# Patient Record
Sex: FEMALE | Race: WHITE | NOT HISPANIC OR LATINO | Employment: UNEMPLOYED | URBAN - NONMETROPOLITAN AREA
[De-identification: names, ages, dates, MRNs, and addresses within clinical notes are randomized per-mention and may not be internally consistent; named-entity substitution may affect disease eponyms.]

---

## 2021-04-22 ENCOUNTER — INITIAL PRENATAL (OUTPATIENT)
Dept: OBSTETRICS AND GYNECOLOGY | Facility: CLINIC | Age: 26
End: 2021-04-22

## 2021-04-22 VITALS
SYSTOLIC BLOOD PRESSURE: 102 MMHG | BODY MASS INDEX: 29.41 KG/M2 | WEIGHT: 183 LBS | HEIGHT: 66 IN | DIASTOLIC BLOOD PRESSURE: 72 MMHG

## 2021-04-22 DIAGNOSIS — Z34.01 ENCOUNTER FOR SUPERVISION OF NORMAL FIRST PREGNANCY IN FIRST TRIMESTER: Primary | ICD-10-CM

## 2021-04-22 PROCEDURE — 0501F PRENATAL FLOW SHEET: CPT | Performed by: OBSTETRICS & GYNECOLOGY

## 2021-04-22 RX ORDER — PRENATAL VIT NO.126/IRON/FOLIC 28MG-0.8MG
TABLET ORAL DAILY
COMMUNITY

## 2021-04-22 NOTE — PROGRESS NOTES
New OB, US ordered today, reviewed and shows 8 weeks gestation, EDC 11/29  Having mild nausea, some fatigue, but rare headaches  New OB labs ordered today  Discussed OTC Unisom and B6, Miralax for consitpation  Discussed normal prenatal routines  Questions answered    Diagnoses and all orders for this visit:    1. Encounter for supervision of normal first pregnancy in first trimester (Primary)  -     Chlamydia trachomatis, Neisseria gonorrhoeae, PCR w/ confirmation - Urine, Urine, Clean Catch  -     ABO / Rh  -     Antibody Screen  -     CBC & Differential  -     Ambulatory Referral to Perinatology  -     Urine Culture - Urine, Urine, Clean Catch  -     ToxASSURE Select 13 (MW) - Urine, Clean Catch  -     Rubella Antibody, IgG  -     RPR  -     HIV-1 / O / 2 Ag / Antibody 4th Generation  -     Hepatitis B Surface Antigen

## 2021-04-28 LAB
ABO GROUP BLD: NORMAL
BACTERIA UR CULT: NORMAL
BACTERIA UR CULT: NORMAL
BASOPHILS # BLD AUTO: 0.04 10*3/MM3 (ref 0–0.2)
BASOPHILS NFR BLD AUTO: 0.5 % (ref 0–1.5)
BLD GP AB SCN SERPL QL: NEGATIVE
C TRACH RRNA SPEC QL NAA+PROBE: NEGATIVE
DRUGS UR: NORMAL
EOSINOPHIL # BLD AUTO: 0.15 10*3/MM3 (ref 0–0.4)
EOSINOPHIL NFR BLD AUTO: 1.7 % (ref 0.3–6.2)
ERYTHROCYTE [DISTWIDTH] IN BLOOD BY AUTOMATED COUNT: 12.4 % (ref 12.3–15.4)
HBV SURFACE AG SERPL QL IA: NEGATIVE
HCT VFR BLD AUTO: 39.9 % (ref 34–46.6)
HGB BLD-MCNC: 13.2 G/DL (ref 12–15.9)
HIV 1+2 AB+HIV1 P24 AG SERPL QL IA: NON REACTIVE
IMM GRANULOCYTES # BLD AUTO: 0.02 10*3/MM3 (ref 0–0.05)
IMM GRANULOCYTES NFR BLD AUTO: 0.2 % (ref 0–0.5)
LYMPHOCYTES # BLD AUTO: 2.99 10*3/MM3 (ref 0.7–3.1)
LYMPHOCYTES NFR BLD AUTO: 34.4 % (ref 19.6–45.3)
MCH RBC QN AUTO: 31.4 PG (ref 26.6–33)
MCHC RBC AUTO-ENTMCNC: 33.1 G/DL (ref 31.5–35.7)
MCV RBC AUTO: 95 FL (ref 79–97)
MONOCYTES # BLD AUTO: 0.5 10*3/MM3 (ref 0.1–0.9)
MONOCYTES NFR BLD AUTO: 5.8 % (ref 5–12)
N GONORRHOEA RRNA SPEC QL NAA+PROBE: NEGATIVE
NEUTROPHILS # BLD AUTO: 4.98 10*3/MM3 (ref 1.7–7)
NEUTROPHILS NFR BLD AUTO: 57.4 % (ref 42.7–76)
NRBC BLD AUTO-RTO: 0 /100 WBC (ref 0–0.2)
PLATELET # BLD AUTO: 250 10*3/MM3 (ref 140–450)
RBC # BLD AUTO: 4.2 10*6/MM3 (ref 3.77–5.28)
RH BLD: POSITIVE
RPR SER QL: NON REACTIVE
RUBV IGG SERPL IA-ACNC: 18.3 INDEX
WBC # BLD AUTO: 8.68 10*3/MM3 (ref 3.4–10.8)

## 2021-05-21 ENCOUNTER — ROUTINE PRENATAL (OUTPATIENT)
Dept: OBSTETRICS AND GYNECOLOGY | Facility: CLINIC | Age: 26
End: 2021-05-21

## 2021-05-21 VITALS — SYSTOLIC BLOOD PRESSURE: 102 MMHG | BODY MASS INDEX: 30.32 KG/M2 | DIASTOLIC BLOOD PRESSURE: 62 MMHG | WEIGHT: 185 LBS

## 2021-05-21 DIAGNOSIS — Z34.02 ENCOUNTER FOR SUPERVISION OF NORMAL FIRST PREGNANCY IN SECOND TRIMESTER: Primary | ICD-10-CM

## 2021-05-21 PROBLEM — Z34.01 ENCOUNTER FOR SUPERVISION OF NORMAL FIRST PREGNANCY IN FIRST TRIMESTER: Status: RESOLVED | Noted: 2021-04-22 | Resolved: 2021-05-21

## 2021-05-21 LAB
GLUCOSE UR STRIP-MCNC: NEGATIVE MG/DL
PROT UR STRIP-MCNC: NEGATIVE MG/DL

## 2021-05-21 PROCEDURE — 0502F SUBSEQUENT PRENATAL CARE: CPT | Performed by: OBSTETRICS & GYNECOLOGY

## 2021-05-21 NOTE — PROGRESS NOTES
Feeling well, no nausea  Has had a couple of lightheaded episodes  Reviewed normal prenatal labs  Discussed ffDNA and maternal carrier screening, declines for now    Diagnoses and all orders for this visit:    1. Encounter for supervision of normal first pregnancy in second trimester (Primary)

## 2021-05-27 ENCOUNTER — ROUTINE PRENATAL (OUTPATIENT)
Dept: OBSTETRICS AND GYNECOLOGY | Facility: CLINIC | Age: 26
End: 2021-05-27

## 2021-05-27 VITALS — WEIGHT: 188 LBS | SYSTOLIC BLOOD PRESSURE: 104 MMHG | BODY MASS INDEX: 30.81 KG/M2 | DIASTOLIC BLOOD PRESSURE: 64 MMHG

## 2021-05-27 DIAGNOSIS — Z34.02 ENCOUNTER FOR SUPERVISION OF NORMAL FIRST PREGNANCY IN SECOND TRIMESTER: Primary | ICD-10-CM

## 2021-05-27 LAB
GLUCOSE UR STRIP-MCNC: NEGATIVE MG/DL
PROT UR STRIP-MCNC: NEGATIVE MG/DL

## 2021-05-27 PROCEDURE — 99212 OFFICE O/P EST SF 10 MIN: CPT | Performed by: OBSTETRICS & GYNECOLOGY

## 2021-05-27 NOTE — PROGRESS NOTES
Woke up this morning with blood in her underwear. No bleeding since then.  O positive, Hgb 13.2  US today viable IUP   Bleeding precautions  Dx: threatened

## 2021-06-24 ENCOUNTER — ROUTINE PRENATAL (OUTPATIENT)
Dept: OBSTETRICS AND GYNECOLOGY | Facility: CLINIC | Age: 26
End: 2021-06-24

## 2021-06-24 VITALS — DIASTOLIC BLOOD PRESSURE: 82 MMHG | SYSTOLIC BLOOD PRESSURE: 124 MMHG | WEIGHT: 193 LBS | BODY MASS INDEX: 31.63 KG/M2

## 2021-06-24 DIAGNOSIS — Z34.02 ENCOUNTER FOR SUPERVISION OF NORMAL FIRST PREGNANCY IN SECOND TRIMESTER: Primary | ICD-10-CM

## 2021-06-24 LAB
GLUCOSE UR STRIP-MCNC: NEGATIVE MG/DL
PROT UR STRIP-MCNC: NEGATIVE MG/DL

## 2021-06-24 PROCEDURE — 0502F SUBSEQUENT PRENATAL CARE: CPT | Performed by: OBSTETRICS & GYNECOLOGY

## 2021-06-24 NOTE — PROGRESS NOTES
Starting to feel fetal movement  Feeling well  Gender peek today, BOY!  Schedule anatomy US 7/15    Diagnoses and all orders for this visit:    1. Encounter for supervision of normal first pregnancy in second trimester (Primary)

## 2021-07-15 ENCOUNTER — ROUTINE PRENATAL (OUTPATIENT)
Dept: OBSTETRICS AND GYNECOLOGY | Facility: CLINIC | Age: 26
End: 2021-07-15

## 2021-07-15 VITALS — BODY MASS INDEX: 32.45 KG/M2 | SYSTOLIC BLOOD PRESSURE: 102 MMHG | DIASTOLIC BLOOD PRESSURE: 80 MMHG | WEIGHT: 198 LBS

## 2021-07-15 DIAGNOSIS — Z34.02 ENCOUNTER FOR SUPERVISION OF NORMAL FIRST PREGNANCY IN SECOND TRIMESTER: Primary | ICD-10-CM

## 2021-07-15 LAB
GLUCOSE UR STRIP-MCNC: NEGATIVE MG/DL
PROT UR STRIP-MCNC: NEGATIVE MG/DL

## 2021-07-15 PROCEDURE — 0502F SUBSEQUENT PRENATAL CARE: CPT | Performed by: OBSTETRICS & GYNECOLOGY

## 2021-07-15 NOTE — PROGRESS NOTES
Feeling fetal movement  Feeling well  Has anatomy US later today    Diagnoses and all orders for this visit:    1. Encounter for supervision of normal first pregnancy in second trimester (Primary)

## 2021-08-12 ENCOUNTER — ROUTINE PRENATAL (OUTPATIENT)
Dept: OBSTETRICS AND GYNECOLOGY | Facility: CLINIC | Age: 26
End: 2021-08-12

## 2021-08-12 VITALS — SYSTOLIC BLOOD PRESSURE: 108 MMHG | WEIGHT: 205 LBS | DIASTOLIC BLOOD PRESSURE: 74 MMHG | BODY MASS INDEX: 33.59 KG/M2

## 2021-08-12 DIAGNOSIS — Z34.02 ENCOUNTER FOR SUPERVISION OF NORMAL FIRST PREGNANCY IN SECOND TRIMESTER: Primary | ICD-10-CM

## 2021-08-12 LAB
GLUCOSE UR STRIP-MCNC: NEGATIVE MG/DL
PROT UR STRIP-MCNC: NEGATIVE MG/DL

## 2021-08-12 PROCEDURE — 0502F SUBSEQUENT PRENATAL CARE: CPT | Performed by: OBSTETRICS & GYNECOLOGY

## 2021-08-12 NOTE — PROGRESS NOTES
Good fetal movement  Reviewed normal anatomy US  Glucola and Hgb ordered for next visit  Discussed possible dizzy spells and ensuring adequate hydration    Diagnoses and all orders for this visit:    1. Encounter for supervision of normal first pregnancy in second trimester (Primary)

## 2021-09-02 ENCOUNTER — ROUTINE PRENATAL (OUTPATIENT)
Dept: OBSTETRICS AND GYNECOLOGY | Facility: CLINIC | Age: 26
End: 2021-09-02

## 2021-09-02 VITALS — DIASTOLIC BLOOD PRESSURE: 76 MMHG | SYSTOLIC BLOOD PRESSURE: 118 MMHG | WEIGHT: 208 LBS | BODY MASS INDEX: 34.09 KG/M2

## 2021-09-02 DIAGNOSIS — Z34.93 PRENATAL CARE IN THIRD TRIMESTER: Primary | ICD-10-CM

## 2021-09-02 DIAGNOSIS — Z34.03 ENCOUNTER FOR SUPERVISION OF NORMAL FIRST PREGNANCY IN THIRD TRIMESTER: ICD-10-CM

## 2021-09-02 PROBLEM — Z34.02 ENCOUNTER FOR SUPERVISION OF NORMAL FIRST PREGNANCY IN SECOND TRIMESTER: Status: RESOLVED | Noted: 2021-05-21 | Resolved: 2021-09-02

## 2021-09-02 LAB
GLUCOSE UR STRIP-MCNC: NEGATIVE MG/DL
PROT UR STRIP-MCNC: NEGATIVE MG/DL

## 2021-09-02 PROCEDURE — 0502F SUBSEQUENT PRENATAL CARE: CPT | Performed by: OBSTETRICS & GYNECOLOGY

## 2021-09-02 NOTE — PATIENT INSTRUCTIONS
Jovanny Sterling Contractions  Contractions of the uterus can occur throughout pregnancy, but they are not always a sign that you are in labor. You may have practice contractions called [place] contractions. These false labor contractions are sometimes confused with true labor.  What are [place] contractions?  [place]ov[place] Sterling contractions are tightening movements that occur in the muscles of the uterus before labor. Unlike true labor contractions, these contractions do not result in opening (dilation) and thinning of the cervix. Toward the end of pregnancy (32-34 weeks), [place] contractions can happen more often and may become stronger. These contractions are sometimes difficult to tell apart from true labor because they can be very uncomfortable. You should not feel embarrassed if you go to the hospital with false labor.  Sometimes, the only way to tell if you are in true labor is for your health care provider to look for changes in the cervix. The health care provider will do a physical exam and may monitor your contractions. If you are not in true labor, the exam should show that your cervix is not dilating and your water has not broken.  If there are no other health problems associated with your pregnancy, it is completely safe for you to be sent home with false labor. You may continue to have [place] Sterling contractions until you go into true labor.  How to tell the difference between true labor and false labor  True labor  · Contractions last 30-70 seconds.  · Contractions become very regular.  · Discomfort is usually felt in the top of the uterus, and it spreads to the lower abdomen and low back.  · Contractions do not go away with walking.  · Contractions usually become more intense and increase in frequency.  · The cervix dilates and gets thinner.  False labor  · Contractions are usually shorter and not as strong as true labor contractions.  · Contractions are usually irregular.  · Contractions  are often felt in the front of the lower abdomen and in the groin.  · Contractions may go away when you walk around or change positions while lying down.  · Contractions get weaker and are shorter-lasting as time goes on.  · The cervix usually does not dilate or become thin.  Follow these instructions at home:    · Take over-the-counter and prescription medicines only as told by your health care provider.  · Keep up with your usual exercises and follow other instructions from your health care provider.  · Eat and drink lightly if you think you are going into labor.  · If [place] contractions are making you uncomfortable:  ? Change your position from lying down or resting to walking, or change from walking to resting.  ? Sit and rest in a tub of warm water.  ? Drink enough fluid to keep your urine pale yellow. Dehydration may cause these contractions.  ? Do slow and deep breathing several times an hour.  · Keep all follow-up prenatal visits as told by your health care provider. This is important.  Contact a health care provider if:  · You have a fever.  · You have continuous pain in your abdomen.  Get help right away if:  · Your contractions become stronger, more regular, and closer together.  · You have fluid leaking or gushing from your vagina.  · You pass blood-tinged mucus (bloody show).  · You have bleeding from your vagina.  · You have low back pain that you never had before.  · You feel your baby’s head pushing down and causing pelvic pressure.  · Your baby is not moving inside you as much as it used to.  Summary  · Contractions that occur before labor are called Jovanny Sterling contractions, false labor, or practice contractions.  · [place] contractions are usually shorter, weaker, farther apart, and less regular than true labor contractions. True labor contractions usually become progressively stronger and regular, and they become more frequent.  · Manage discomfort from [place] Sterling contractions  by changing position, resting in a warm bath, drinking plenty of water, or practicing deep breathing.  This information is not intended to replace advice given to you by your health care provider. Make sure you discuss any questions you have with your health care provider.  Document Revised: 11/30/2018 Document Reviewed: 05/03/2018  Elsevier Patient Education © 2021 [employer] Inc.

## 2021-09-02 NOTE — PROGRESS NOTES
Good fetal movement  Glucola and Hgb today, Rh positive  Discussed [place] Sterling contractions    Diagnoses and all orders for this visit:    1. Prenatal care in third trimester (Primary)  -     Gestational Screen 1 Hr ([facility])  -     Hemoglobin    2. Encounter for supervision of normal first pregnancy in third trimester

## 2021-09-03 LAB
GLUCOSE 1H P 50 G GLC PO SERPL-MCNC: 156 MG/DL (ref 65–139)
HGB BLD-MCNC: 11.2 G/DL (ref 12–15.9)

## 2021-09-14 ENCOUNTER — ROUTINE PRENATAL (OUTPATIENT)
Dept: OBSTETRICS AND GYNECOLOGY | Facility: CLINIC | Age: 26
End: 2021-09-14

## 2021-09-14 VITALS — DIASTOLIC BLOOD PRESSURE: 68 MMHG | SYSTOLIC BLOOD PRESSURE: 102 MMHG | WEIGHT: 209 LBS | BODY MASS INDEX: 34.25 KG/M2

## 2021-09-14 DIAGNOSIS — Z34.03 ENCOUNTER FOR SUPERVISION OF NORMAL FIRST PREGNANCY IN THIRD TRIMESTER: Primary | ICD-10-CM

## 2021-09-14 LAB
GLUCOSE UR STRIP-MCNC: NEGATIVE MG/DL
PROT UR STRIP-MCNC: NEGATIVE MG/DL

## 2021-09-14 PROCEDURE — 0502F SUBSEQUENT PRENATAL CARE: CPT | Performed by: OBSTETRICS & GYNECOLOGY

## 2021-09-14 NOTE — PATIENT INSTRUCTIONS
Jovanny Sterling Contractions  Contractions of the uterus can occur throughout pregnancy, but they are not always a sign that you are in labor. You may have practice contractions called [place] Sterling contractions. These false labor contractions are sometimes confused with true labor.  What are [place] Sterling contractions?  Jovanny Sterling contractions are tightening movements that occur in the muscles of the uterus before labor. Unlike true labor contractions, these contractions do not result in opening (dilation) and thinning of the cervix. Toward the end of pregnancy (32-34 weeks), [place] contractions can happen more often and may become stronger. These contractions are sometimes difficult to tell apart from true labor because they can be very uncomfortable. You should not feel embarrassed if you go to the hospital with false labor.  Sometimes, the only way to tell if you are in true labor is for your health care provider to look for changes in the cervix. The health care provider will do a physical exam and may monitor your contractions. If you are not in true labor, the exam should show that your cervix is not dilating and your water has not broken.  If there are no other health problems associated with your pregnancy, it is completely safe for you to be sent home with false labor. You may continue to have [place] Sterling contractions until you go into true labor.  How to tell the difference between true labor and false labor  True labor  · Contractions last 30-70 seconds.  · Contractions become very regular.  · Discomfort is usually felt in the top of the uterus, and it spreads to the lower abdomen and low back.  · Contractions do not go away with walking.  · Contractions usually become more intense and increase in frequency.  · The cervix dilates and gets thinner.  False labor  · Contractions are usually shorter and not as strong as true labor contractions.  · Contractions are usually irregular.  · Contractions  are often felt in the front of the lower abdomen and in the groin.  · Contractions may go away when you walk around or change positions while lying down.  · Contractions get weaker and are shorter-lasting as time goes on.  · The cervix usually does not dilate or become thin.  Follow these instructions at home:    · Take over-the-counter and prescription medicines only as told by your health care provider.  · Keep up with your usual exercises and follow other instructions from your health care provider.  · Eat and drink lightly if you think you are going into labor.  · If [place] Sterling contractions are making you uncomfortable:  ? Change your position from lying down or resting to walking, or change from walking to resting.  ? Sit and rest in a tub of warm water.  ? Drink enough fluid to keep your urine pale yellow. Dehydration may cause these contractions.  ? Do slow and deep breathing several times an hour.  · Keep all follow-up prenatal visits as told by your health care provider. This is important.  Contact a health care provider if:  · You have a fever.  · You have continuous pain in your abdomen.  Get help right away if:  · Your contractions become stronger, more regular, and closer together.  · You have fluid leaking or gushing from your vagina.  · You pass blood-tinged mucus (bloody show).  · You have bleeding from your vagina.  · You have low back pain that you never had before.  · You feel your baby’s head pushing down and causing pelvic pressure.  · Your baby is not moving inside you as much as it used to.  Summary  · Contractions that occur before labor are called Jovanny Sterling contractions, false labor, or practice contractions.  · [place] Sterling contractions are usually shorter, weaker, farther apart, and less regular than true labor contractions. True labor contractions usually become progressively stronger and regular, and they become more frequent.  · Manage discomfort from [place] Sterling contractions  by changing position, resting in a warm bath, drinking plenty of water, or practicing deep breathing.  This information is not intended to replace advice given to you by your health care provider. Make sure you discuss any questions you have with your health care provider.  Document Revised: 11/30/2018 Document Reviewed: 05/03/2018  Elsevier Patient Education © 2021 Elsevier Inc.

## 2021-09-14 NOTE — PROGRESS NOTES
Good fetal movement  No contractions, no reflux  Reviewed normal 3 hour Glucola and Hgb  Discuss Tdap next visit   labor precautions    Diagnoses and all orders for this visit:    1. Encounter for supervision of normal first pregnancy in third trimester (Primary)

## 2021-10-01 ENCOUNTER — ROUTINE PRENATAL (OUTPATIENT)
Dept: OBSTETRICS AND GYNECOLOGY | Facility: CLINIC | Age: 26
End: 2021-10-01

## 2021-10-01 VITALS — WEIGHT: 214 LBS | BODY MASS INDEX: 35.07 KG/M2 | SYSTOLIC BLOOD PRESSURE: 110 MMHG | DIASTOLIC BLOOD PRESSURE: 76 MMHG

## 2021-10-01 DIAGNOSIS — Z34.03 ENCOUNTER FOR SUPERVISION OF NORMAL FIRST PREGNANCY IN THIRD TRIMESTER: Primary | ICD-10-CM

## 2021-10-01 LAB
GLUCOSE UR STRIP-MCNC: NEGATIVE MG/DL
PROT UR STRIP-MCNC: NEGATIVE MG/DL

## 2021-10-01 PROCEDURE — 90715 TDAP VACCINE 7 YRS/> IM: CPT | Performed by: OBSTETRICS & GYNECOLOGY

## 2021-10-01 PROCEDURE — 0502F SUBSEQUENT PRENATAL CARE: CPT | Performed by: OBSTETRICS & GYNECOLOGY

## 2021-10-01 PROCEDURE — 90471 IMMUNIZATION ADMIN: CPT | Performed by: OBSTETRICS & GYNECOLOGY

## 2021-10-01 NOTE — PROGRESS NOTES
at 31.4 IUP presents for follow up. No obstetrical complaints.   PE see above   A/P  at 31.4 IUP   RTC in 2 weeks   PTL Precautions   Received flu shot at her work   Tdap today

## 2021-10-15 ENCOUNTER — ROUTINE PRENATAL (OUTPATIENT)
Dept: OBSTETRICS AND GYNECOLOGY | Facility: CLINIC | Age: 26
End: 2021-10-15

## 2021-10-15 VITALS — DIASTOLIC BLOOD PRESSURE: 84 MMHG | WEIGHT: 216 LBS | BODY MASS INDEX: 35.4 KG/M2 | SYSTOLIC BLOOD PRESSURE: 122 MMHG

## 2021-10-15 DIAGNOSIS — Z34.03 ENCOUNTER FOR SUPERVISION OF NORMAL FIRST PREGNANCY IN THIRD TRIMESTER: Primary | ICD-10-CM

## 2021-10-15 LAB
GLUCOSE UR STRIP-MCNC: NEGATIVE MG/DL
PROT UR STRIP-MCNC: NEGATIVE MG/DL

## 2021-10-15 PROCEDURE — 0502F SUBSEQUENT PRENATAL CARE: CPT | Performed by: OBSTETRICS & GYNECOLOGY

## 2021-10-15 NOTE — PROGRESS NOTES
Good fetal movement  Labor precautions  Has had Tdap and flu vaccine    Diagnoses and all orders for this visit:    1. Encounter for supervision of normal first pregnancy in third trimester (Primary)

## 2021-10-28 ENCOUNTER — ROUTINE PRENATAL (OUTPATIENT)
Dept: OBSTETRICS AND GYNECOLOGY | Facility: CLINIC | Age: 26
End: 2021-10-28

## 2021-10-28 VITALS — DIASTOLIC BLOOD PRESSURE: 80 MMHG | SYSTOLIC BLOOD PRESSURE: 112 MMHG | BODY MASS INDEX: 35.89 KG/M2 | WEIGHT: 219 LBS

## 2021-10-28 DIAGNOSIS — Z34.03 ENCOUNTER FOR SUPERVISION OF NORMAL FIRST PREGNANCY IN THIRD TRIMESTER: Primary | ICD-10-CM

## 2021-10-28 LAB
GLUCOSE UR STRIP-MCNC: NEGATIVE MG/DL
PROT UR STRIP-MCNC: NEGATIVE MG/DL

## 2021-10-28 PROCEDURE — 0502F SUBSEQUENT PRENATAL CARE: CPT | Performed by: OBSTETRICS & GYNECOLOGY

## 2021-10-28 NOTE — PROGRESS NOTES
Good fetal movement  Labor precautions  Reviewed normal 1 hour glucose screening  GBS and cx's next visit    Diagnoses and all orders for this visit:    1. Encounter for supervision of normal first pregnancy in third trimester (Primary)

## 2021-11-04 ENCOUNTER — ROUTINE PRENATAL (OUTPATIENT)
Dept: OBSTETRICS AND GYNECOLOGY | Facility: CLINIC | Age: 26
End: 2021-11-04

## 2021-11-04 VITALS — DIASTOLIC BLOOD PRESSURE: 84 MMHG | WEIGHT: 222 LBS | SYSTOLIC BLOOD PRESSURE: 110 MMHG | BODY MASS INDEX: 36.38 KG/M2

## 2021-11-04 DIAGNOSIS — Z34.03 ENCOUNTER FOR SUPERVISION OF NORMAL FIRST PREGNANCY IN THIRD TRIMESTER: Primary | ICD-10-CM

## 2021-11-04 LAB
GLUCOSE UR STRIP-MCNC: NEGATIVE MG/DL
PROT UR STRIP-MCNC: NEGATIVE MG/DL

## 2021-11-04 PROCEDURE — 0502F SUBSEQUENT PRENATAL CARE: CPT | Performed by: OBSTETRICS & GYNECOLOGY

## 2021-11-04 NOTE — PROGRESS NOTES
Good fetal movement  Has had a few contractions  Cervix moderate, posterior  GBS and GC/Chl ordered and done  Labor instructions    Diagnoses and all orders for this visit:    1. Encounter for supervision of normal first pregnancy in third trimester (Primary)  -     Chlamydia trachomatis, Neisseria gonorrhoeae, PCR w/ confirmation - Swab, Cervix  -     Strep B Screen - Swab, Vaginal/Rectum

## 2021-11-07 LAB
C TRACH RRNA SPEC QL NAA+PROBE: NEGATIVE
GP B STREP DNA SPEC QL NAA+PROBE: NEGATIVE
N GONORRHOEA RRNA SPEC QL NAA+PROBE: NEGATIVE

## 2021-11-12 ENCOUNTER — ROUTINE PRENATAL (OUTPATIENT)
Dept: OBSTETRICS AND GYNECOLOGY | Facility: CLINIC | Age: 26
End: 2021-11-12

## 2021-11-12 VITALS — SYSTOLIC BLOOD PRESSURE: 112 MMHG | BODY MASS INDEX: 36.38 KG/M2 | DIASTOLIC BLOOD PRESSURE: 82 MMHG | WEIGHT: 222 LBS

## 2021-11-12 DIAGNOSIS — Z34.03 ENCOUNTER FOR SUPERVISION OF NORMAL FIRST PREGNANCY IN THIRD TRIMESTER: Primary | ICD-10-CM

## 2021-11-12 LAB
GLUCOSE UR STRIP-MCNC: NEGATIVE MG/DL
PROT UR STRIP-MCNC: NEGATIVE MG/DL

## 2021-11-12 PROCEDURE — 0502F SUBSEQUENT PRENATAL CARE: CPT | Performed by: OBSTETRICS & GYNECOLOGY

## 2021-11-12 NOTE — PROGRESS NOTES
Good fetal movement  Has had a few contractions  Cervix moderate, anterior  Reviewed GBS negative  Labor instructions    Diagnoses and all orders for this visit:    1. Encounter for supervision of normal first pregnancy in third trimester (Primary)

## 2021-11-18 ENCOUNTER — ROUTINE PRENATAL (OUTPATIENT)
Dept: OBSTETRICS AND GYNECOLOGY | Facility: CLINIC | Age: 26
End: 2021-11-18

## 2021-11-18 VITALS — WEIGHT: 224 LBS | DIASTOLIC BLOOD PRESSURE: 76 MMHG | SYSTOLIC BLOOD PRESSURE: 104 MMHG | BODY MASS INDEX: 36.71 KG/M2

## 2021-11-18 DIAGNOSIS — Z34.03 ENCOUNTER FOR SUPERVISION OF NORMAL FIRST PREGNANCY IN THIRD TRIMESTER: Primary | ICD-10-CM

## 2021-11-18 LAB
GLUCOSE UR STRIP-MCNC: NEGATIVE MG/DL
PROT UR STRIP-MCNC: NEGATIVE MG/DL

## 2021-11-18 PROCEDURE — 0502F SUBSEQUENT PRENATAL CARE: CPT | Performed by: OBSTETRICS & GYNECOLOGY

## 2021-11-18 NOTE — PROGRESS NOTES
Good fetal movement  No contractions  Cervix mid position, moderate  Reviewed GBS negative  Labor instructions    Diagnoses and all orders for this visit:    1. Encounter for supervision of normal first pregnancy in third trimester (Primary)

## 2021-11-24 ENCOUNTER — ROUTINE PRENATAL (OUTPATIENT)
Dept: OBSTETRICS AND GYNECOLOGY | Facility: CLINIC | Age: 26
End: 2021-11-24

## 2021-11-24 VITALS — DIASTOLIC BLOOD PRESSURE: 86 MMHG | BODY MASS INDEX: 36.38 KG/M2 | WEIGHT: 222 LBS | SYSTOLIC BLOOD PRESSURE: 122 MMHG

## 2021-11-24 DIAGNOSIS — Z34.03 ENCOUNTER FOR SUPERVISION OF NORMAL FIRST PREGNANCY IN THIRD TRIMESTER: Primary | ICD-10-CM

## 2021-11-24 LAB
GLUCOSE UR STRIP-MCNC: NEGATIVE MG/DL
PROT UR STRIP-MCNC: NEGATIVE MG/DL

## 2021-11-24 PROCEDURE — 0502F SUBSEQUENT PRENATAL CARE: CPT | Performed by: OBSTETRICS & GYNECOLOGY

## 2021-11-24 NOTE — PROGRESS NOTES
Good fetal movement  No contractions  Cervix moderate, anterior  Reviewed GBS negative  Labor instructions    Diagnoses and all orders for this visit:    1. Encounter for supervision of normal first pregnancy in third trimester (Primary)

## 2021-11-29 ENCOUNTER — ANESTHESIA (OUTPATIENT)
Dept: LABOR AND DELIVERY | Facility: HOSPITAL | Age: 26
End: 2021-11-29

## 2021-11-29 ENCOUNTER — ANESTHESIA EVENT (OUTPATIENT)
Dept: LABOR AND DELIVERY | Facility: HOSPITAL | Age: 26
End: 2021-11-29

## 2021-11-29 ENCOUNTER — HOSPITAL ENCOUNTER (INPATIENT)
Facility: HOSPITAL | Age: 26
LOS: 2 days | Discharge: HOME | End: 2021-12-01
Attending: OBSTETRICS & GYNECOLOGY | Admitting: OBSTETRICS & GYNECOLOGY

## 2021-11-29 DIAGNOSIS — O36.4XX0 FETAL DEMISE, GREATER THAN 22 WEEKS, ANTEPARTUM, SINGLE OR UNSPECIFIED FETUS: Primary | ICD-10-CM

## 2021-11-29 LAB
ABO GROUP BLD: NORMAL
ALBUMIN SERPL-MCNC: 3.7 G/DL (ref 3.5–5.2)
ALBUMIN/GLOB SERPL: 1.1 G/DL
ALP SERPL-CCNC: 150 U/L (ref 39–117)
ALT SERPL W P-5'-P-CCNC: 17 U/L (ref 1–33)
ANION GAP SERPL CALCULATED.3IONS-SCNC: 11 MMOL/L (ref 5–15)
AST SERPL-CCNC: 24 U/L (ref 1–32)
BASOPHILS # BLD AUTO: 0.03 10*3/MM3 (ref 0–0.2)
BASOPHILS NFR BLD AUTO: 0.3 % (ref 0–1.5)
BILIRUB SERPL-MCNC: 0.2 MG/DL (ref 0–1.2)
BLD GP AB SCN SERPL QL: NEGATIVE
BUN SERPL-MCNC: 6 MG/DL (ref 6–20)
BUN/CREAT SERPL: 14.6 (ref 7–25)
CALCIUM SPEC-SCNC: 9 MG/DL (ref 8.6–10.5)
CHLORIDE SERPL-SCNC: 103 MMOL/L (ref 98–107)
CO2 SERPL-SCNC: 23 MMOL/L (ref 22–29)
CREAT SERPL-MCNC: 0.41 MG/DL (ref 0.57–1)
DEPRECATED RDW RBC AUTO: 43.2 FL (ref 37–54)
EOSINOPHIL # BLD AUTO: 0.03 10*3/MM3 (ref 0–0.4)
EOSINOPHIL NFR BLD AUTO: 0.3 % (ref 0.3–6.2)
ERYTHROCYTE [DISTWIDTH] IN BLOOD BY AUTOMATED COUNT: 12.7 % (ref 12.3–15.4)
FIBRINOGEN PPP-MCNC: 421 MG/DL (ref 240–460)
GFR SERPL CREATININE-BSD FRML MDRD: >150 ML/MIN/1.73
GLOBULIN UR ELPH-MCNC: 3.5 GM/DL
GLUCOSE SERPL-MCNC: 78 MG/DL (ref 65–99)
HBA1C MFR BLD: 5.1 % (ref 4.8–5.6)
HCT VFR BLD AUTO: 37.3 % (ref 34–46.6)
HGB BLD-MCNC: 12.3 G/DL (ref 12–15.9)
HGB F BLD QL KLEIH BETKE: NORMAL
IMM GRANULOCYTES # BLD AUTO: 0.04 10*3/MM3 (ref 0–0.05)
IMM GRANULOCYTES NFR BLD AUTO: 0.4 % (ref 0–0.5)
LYMPHOCYTES # BLD AUTO: 2.02 10*3/MM3 (ref 0.7–3.1)
LYMPHOCYTES NFR BLD AUTO: 20.1 % (ref 19.6–45.3)
MCH RBC QN AUTO: 30.4 PG (ref 26.6–33)
MCHC RBC AUTO-ENTMCNC: 33 G/DL (ref 31.5–35.7)
MCV RBC AUTO: 92.3 FL (ref 79–97)
MONOCYTES # BLD AUTO: 0.55 10*3/MM3 (ref 0.1–0.9)
MONOCYTES NFR BLD AUTO: 5.5 % (ref 5–12)
NEUTROPHILS NFR BLD AUTO: 7.4 10*3/MM3 (ref 1.7–7)
NEUTROPHILS NFR BLD AUTO: 73.4 % (ref 42.7–76)
NRBC BLD AUTO-RTO: 0 /100 WBC (ref 0–0.2)
PLATELET # BLD AUTO: 218 10*3/MM3 (ref 140–450)
PMV BLD AUTO: 10.1 FL (ref 6–12)
POTASSIUM SERPL-SCNC: 3.8 MMOL/L (ref 3.5–5.2)
PROT SERPL-MCNC: 7.2 G/DL (ref 6–8.5)
RBC # BLD AUTO: 4.04 10*6/MM3 (ref 3.77–5.28)
RH BLD: POSITIVE
SARS-COV-2 RNA PNL SPEC NAA+PROBE: NOT DETECTED
SODIUM SERPL-SCNC: 137 MMOL/L (ref 136–145)
T&S EXPIRATION DATE: NORMAL
TSH SERPL DL<=0.05 MIU/L-ACNC: 3 UIU/ML (ref 0.27–4.2)
WBC NRBC COR # BLD: 10.07 10*3/MM3 (ref 3.4–10.8)

## 2021-11-29 PROCEDURE — 87635 SARS-COV-2 COVID-19 AMP PRB: CPT | Performed by: OBSTETRICS & GYNECOLOGY

## 2021-11-29 PROCEDURE — 85384 FIBRINOGEN ACTIVITY: CPT | Performed by: OBSTETRICS & GYNECOLOGY

## 2021-11-29 PROCEDURE — 25010000002 ONDANSETRON PER 1 MG: Performed by: OBSTETRICS & GYNECOLOGY

## 2021-11-29 PROCEDURE — 84443 ASSAY THYROID STIM HORMONE: CPT | Performed by: OBSTETRICS & GYNECOLOGY

## 2021-11-29 PROCEDURE — 25010000002 PROMETHAZINE PER 50 MG: Performed by: OBSTETRICS & GYNECOLOGY

## 2021-11-29 PROCEDURE — 80053 COMPREHEN METABOLIC PANEL: CPT | Performed by: OBSTETRICS & GYNECOLOGY

## 2021-11-29 PROCEDURE — 83036 HEMOGLOBIN GLYCOSYLATED A1C: CPT | Performed by: OBSTETRICS & GYNECOLOGY

## 2021-11-29 PROCEDURE — 25010000002 ROPIVACAINE PER 1 MG: Performed by: NURSE ANESTHETIST, CERTIFIED REGISTERED

## 2021-11-29 PROCEDURE — 85460 HEMOGLOBIN FETAL: CPT | Performed by: OBSTETRICS & GYNECOLOGY

## 2021-11-29 PROCEDURE — 86850 RBC ANTIBODY SCREEN: CPT | Performed by: OBSTETRICS & GYNECOLOGY

## 2021-11-29 PROCEDURE — C1755 CATHETER, INTRASPINAL: HCPCS | Performed by: NURSE ANESTHETIST, CERTIFIED REGISTERED

## 2021-11-29 PROCEDURE — 3E033VJ INTRODUCTION OF OTHER HORMONE INTO PERIPHERAL VEIN, PERCUTANEOUS APPROACH: ICD-10-PCS | Performed by: OBSTETRICS & GYNECOLOGY

## 2021-11-29 PROCEDURE — 86900 BLOOD TYPING SEROLOGIC ABO: CPT | Performed by: OBSTETRICS & GYNECOLOGY

## 2021-11-29 PROCEDURE — 85025 COMPLETE CBC W/AUTO DIFF WBC: CPT | Performed by: OBSTETRICS & GYNECOLOGY

## 2021-11-29 PROCEDURE — 86901 BLOOD TYPING SEROLOGIC RH(D): CPT | Performed by: OBSTETRICS & GYNECOLOGY

## 2021-11-29 RX ORDER — ONDANSETRON 2 MG/ML
4 INJECTION INTRAMUSCULAR; INTRAVENOUS EVERY 6 HOURS PRN
Status: DISCONTINUED | OUTPATIENT
Start: 2021-11-29 | End: 2021-11-30

## 2021-11-29 RX ORDER — TERBUTALINE SULFATE 1 MG/ML
0.25 INJECTION, SOLUTION SUBCUTANEOUS AS NEEDED
Status: DISCONTINUED | OUTPATIENT
Start: 2021-11-29 | End: 2021-11-30

## 2021-11-29 RX ORDER — LIDOCAINE HYDROCHLORIDE AND EPINEPHRINE 15; 5 MG/ML; UG/ML
INJECTION, SOLUTION EPIDURAL
Status: COMPLETED | OUTPATIENT
Start: 2021-11-29 | End: 2021-11-29

## 2021-11-29 RX ORDER — OXYTOCIN/0.9 % SODIUM CHLORIDE 30/500 ML
999 PLASTIC BAG, INJECTION (ML) INTRAVENOUS ONCE
Status: COMPLETED | OUTPATIENT
Start: 2021-11-29 | End: 2021-11-30

## 2021-11-29 RX ORDER — BUTORPHANOL TARTRATE 1 MG/ML
2 INJECTION, SOLUTION INTRAMUSCULAR; INTRAVENOUS
Status: DISCONTINUED | OUTPATIENT
Start: 2021-11-29 | End: 2021-11-30

## 2021-11-29 RX ORDER — OXYTOCIN/0.9 % SODIUM CHLORIDE 30/500 ML
125 PLASTIC BAG, INJECTION (ML) INTRAVENOUS CONTINUOUS PRN
Status: DISCONTINUED | OUTPATIENT
Start: 2021-11-30 | End: 2021-11-30 | Stop reason: HOSPADM

## 2021-11-29 RX ORDER — BUTORPHANOL TARTRATE 1 MG/ML
1 INJECTION, SOLUTION INTRAMUSCULAR; INTRAVENOUS
Status: DISCONTINUED | OUTPATIENT
Start: 2021-11-29 | End: 2021-11-30

## 2021-11-29 RX ORDER — METHYLERGONOVINE MALEATE 0.2 MG/ML
200 INJECTION INTRAVENOUS ONCE AS NEEDED
Status: DISCONTINUED | OUTPATIENT
Start: 2021-11-29 | End: 2021-11-30

## 2021-11-29 RX ORDER — ONDANSETRON 4 MG/1
4 TABLET, FILM COATED ORAL EVERY 6 HOURS PRN
Status: DISCONTINUED | OUTPATIENT
Start: 2021-11-29 | End: 2021-11-30

## 2021-11-29 RX ORDER — MISOPROSTOL 200 UG/1
800 TABLET ORAL AS NEEDED
Status: DISCONTINUED | OUTPATIENT
Start: 2021-11-29 | End: 2021-11-30

## 2021-11-29 RX ORDER — ACETAMINOPHEN 325 MG/1
650 TABLET ORAL EVERY 4 HOURS PRN
Status: DISCONTINUED | OUTPATIENT
Start: 2021-11-29 | End: 2021-11-30

## 2021-11-29 RX ORDER — EPHEDRINE SULFATE 50 MG/ML
10 INJECTION, SOLUTION INTRAVENOUS
Status: DISCONTINUED | OUTPATIENT
Start: 2021-11-29 | End: 2021-11-30

## 2021-11-29 RX ORDER — ALPRAZOLAM 0.25 MG/1
0.5 TABLET ORAL 3 TIMES DAILY PRN
Status: DISCONTINUED | OUTPATIENT
Start: 2021-11-29 | End: 2021-12-01 | Stop reason: HOSPADM

## 2021-11-29 RX ORDER — SODIUM CHLORIDE 0.9 % (FLUSH) 0.9 %
1-10 SYRINGE (ML) INJECTION AS NEEDED
Status: DISCONTINUED | OUTPATIENT
Start: 2021-11-29 | End: 2021-11-30

## 2021-11-29 RX ORDER — OXYTOCIN/0.9 % SODIUM CHLORIDE 30/500 ML
250 PLASTIC BAG, INJECTION (ML) INTRAVENOUS CONTINUOUS
Status: ACTIVE | OUTPATIENT
Start: 2021-11-29 | End: 2021-11-30

## 2021-11-29 RX ORDER — CARBOPROST TROMETHAMINE 250 UG/ML
250 INJECTION, SOLUTION INTRAMUSCULAR AS NEEDED
Status: DISCONTINUED | OUTPATIENT
Start: 2021-11-29 | End: 2021-11-30

## 2021-11-29 RX ORDER — SODIUM CHLORIDE 0.9 % (FLUSH) 0.9 %
10 SYRINGE (ML) INJECTION EVERY 12 HOURS SCHEDULED
Status: DISCONTINUED | OUTPATIENT
Start: 2021-11-29 | End: 2021-11-30

## 2021-11-29 RX ORDER — SODIUM CHLORIDE, SODIUM LACTATE, POTASSIUM CHLORIDE, CALCIUM CHLORIDE 600; 310; 30; 20 MG/100ML; MG/100ML; MG/100ML; MG/100ML
125 INJECTION, SOLUTION INTRAVENOUS CONTINUOUS
Status: DISCONTINUED | OUTPATIENT
Start: 2021-11-29 | End: 2021-11-30

## 2021-11-29 RX ORDER — OXYTOCIN/0.9 % SODIUM CHLORIDE 30/500 ML
2-30 PLASTIC BAG, INJECTION (ML) INTRAVENOUS
Status: DISCONTINUED | OUTPATIENT
Start: 2021-11-29 | End: 2021-11-30

## 2021-11-29 RX ORDER — TRISODIUM CITRATE DIHYDRATE AND CITRIC ACID MONOHYDRATE 500; 334 MG/5ML; MG/5ML
30 SOLUTION ORAL ONCE AS NEEDED
Status: DISCONTINUED | OUTPATIENT
Start: 2021-11-29 | End: 2021-11-30

## 2021-11-29 RX ORDER — LIDOCAINE HYDROCHLORIDE 20 MG/ML
INJECTION, SOLUTION EPIDURAL; INFILTRATION; INTRACAUDAL; PERINEURAL AS NEEDED
Status: DISCONTINUED | OUTPATIENT
Start: 2021-11-29 | End: 2021-11-30 | Stop reason: SURG

## 2021-11-29 RX ORDER — LIDOCAINE HYDROCHLORIDE 10 MG/ML
5 INJECTION, SOLUTION EPIDURAL; INFILTRATION; INTRACAUDAL; PERINEURAL AS NEEDED
Status: DISCONTINUED | OUTPATIENT
Start: 2021-11-29 | End: 2021-11-30

## 2021-11-29 RX ADMIN — LIDOCAINE HYDROCHLORIDE AND EPINEPHRINE 3 ML: 15; 5 INJECTION, SOLUTION EPIDURAL at 17:53

## 2021-11-29 RX ADMIN — LIDOCAINE HYDROCHLORIDE 5 ML: 20 INJECTION, SOLUTION EPIDURAL; INFILTRATION; INTRACAUDAL; PERINEURAL at 21:09

## 2021-11-29 RX ADMIN — Medication 12 MCG: at 21:09

## 2021-11-29 RX ADMIN — SODIUM CHLORIDE, POTASSIUM CHLORIDE, SODIUM LACTATE AND CALCIUM CHLORIDE 1000 ML: 600; 310; 30; 20 INJECTION, SOLUTION INTRAVENOUS at 21:01

## 2021-11-29 RX ADMIN — PROMETHAZINE HYDROCHLORIDE 25 MG: 25 INJECTION INTRAMUSCULAR; INTRAVENOUS at 22:45

## 2021-11-29 RX ADMIN — ACETAMINOPHEN 650 MG: 325 TABLET, FILM COATED ORAL at 15:27

## 2021-11-29 RX ADMIN — SODIUM CHLORIDE, POTASSIUM CHLORIDE, SODIUM LACTATE AND CALCIUM CHLORIDE 125 ML/HR: 600; 310; 30; 20 INJECTION, SOLUTION INTRAVENOUS at 23:54

## 2021-11-29 RX ADMIN — OXYTOCIN-SODIUM CHLORIDE 0.9% IV SOLN 30 UNIT/500ML 2 MILLI-UNITS/MIN: 30-0.9/5 SOLUTION at 19:53

## 2021-11-29 RX ADMIN — ONDANSETRON 4 MG: 2 INJECTION INTRAMUSCULAR; INTRAVENOUS at 21:36

## 2021-11-29 RX ADMIN — SODIUM CHLORIDE, POTASSIUM CHLORIDE, SODIUM LACTATE AND CALCIUM CHLORIDE 125 ML/HR: 600; 310; 30; 20 INJECTION, SOLUTION INTRAVENOUS at 14:00

## 2021-11-29 RX ADMIN — ROPIVACAINE HYDROCHLORIDE 8 ML/HR: 2 INJECTION, SOLUTION EPIDURAL; INFILTRATION at 21:12

## 2021-11-29 RX ADMIN — SODIUM CHLORIDE, POTASSIUM CHLORIDE, SODIUM LACTATE AND CALCIUM CHLORIDE 125 ML/HR: 600; 310; 30; 20 INJECTION, SOLUTION INTRAVENOUS at 12:50

## 2021-11-30 PROCEDURE — 59400 OBSTETRICAL CARE: CPT | Performed by: OBSTETRICS & GYNECOLOGY

## 2021-11-30 PROCEDURE — 88307 TISSUE EXAM BY PATHOLOGIST: CPT | Performed by: OBSTETRICS & GYNECOLOGY

## 2021-11-30 PROCEDURE — 25010000002 PROMETHAZINE PER 50 MG: Performed by: OBSTETRICS & GYNECOLOGY

## 2021-11-30 PROCEDURE — 51702 INSERT TEMP BLADDER CATH: CPT

## 2021-11-30 PROCEDURE — 0KQM0ZZ REPAIR PERINEUM MUSCLE, OPEN APPROACH: ICD-10-PCS | Performed by: OBSTETRICS & GYNECOLOGY

## 2021-11-30 RX ORDER — HYDROCORTISONE 25 MG/G
1 CREAM TOPICAL AS NEEDED
Status: DISCONTINUED | OUTPATIENT
Start: 2021-11-30 | End: 2021-12-01 | Stop reason: HOSPADM

## 2021-11-30 RX ORDER — IBUPROFEN 600 MG/1
600 TABLET ORAL EVERY 6 HOURS PRN
Status: DISCONTINUED | OUTPATIENT
Start: 2021-11-30 | End: 2021-11-30

## 2021-11-30 RX ORDER — HYDROCODONE BITARTRATE AND ACETAMINOPHEN 5; 325 MG/1; MG/1
1 TABLET ORAL EVERY 4 HOURS PRN
Status: DISCONTINUED | OUTPATIENT
Start: 2021-11-30 | End: 2021-12-01 | Stop reason: HOSPADM

## 2021-11-30 RX ORDER — DOCUSATE SODIUM 100 MG/1
100 CAPSULE, LIQUID FILLED ORAL 2 TIMES DAILY
Status: DISCONTINUED | OUTPATIENT
Start: 2021-11-30 | End: 2021-12-01 | Stop reason: HOSPADM

## 2021-11-30 RX ORDER — HYDROXYZINE HYDROCHLORIDE 25 MG/1
50 TABLET, FILM COATED ORAL NIGHTLY PRN
Status: DISCONTINUED | OUTPATIENT
Start: 2021-11-30 | End: 2021-12-01 | Stop reason: HOSPADM

## 2021-11-30 RX ORDER — MORPHINE SULFATE 2 MG/ML
1 INJECTION, SOLUTION INTRAMUSCULAR; INTRAVENOUS EVERY 4 HOURS PRN
Status: DISCONTINUED | OUTPATIENT
Start: 2021-11-30 | End: 2021-12-01 | Stop reason: HOSPADM

## 2021-11-30 RX ORDER — NALOXONE HCL 0.4 MG/ML
0.4 VIAL (ML) INJECTION
Status: DISCONTINUED | OUTPATIENT
Start: 2021-11-30 | End: 2021-12-01 | Stop reason: HOSPADM

## 2021-11-30 RX ORDER — IBUPROFEN 600 MG/1
600 TABLET ORAL EVERY 8 HOURS PRN
Status: DISCONTINUED | OUTPATIENT
Start: 2021-11-30 | End: 2021-12-01 | Stop reason: HOSPADM

## 2021-11-30 RX ORDER — BISACODYL 10 MG
10 SUPPOSITORY, RECTAL RECTAL DAILY PRN
Status: DISCONTINUED | OUTPATIENT
Start: 2021-12-01 | End: 2021-12-01 | Stop reason: HOSPADM

## 2021-11-30 RX ORDER — METHYLERGONOVINE MALEATE 0.2 MG/ML
200 INJECTION INTRAVENOUS ONCE AS NEEDED
Status: DISCONTINUED | OUTPATIENT
Start: 2021-11-30 | End: 2021-12-01 | Stop reason: HOSPADM

## 2021-11-30 RX ORDER — CALCIUM CARBONATE 200(500)MG
2 TABLET,CHEWABLE ORAL 3 TIMES DAILY PRN
Status: DISCONTINUED | OUTPATIENT
Start: 2021-11-30 | End: 2021-11-30

## 2021-11-30 RX ORDER — MISOPROSTOL 200 UG/1
600 TABLET ORAL ONCE AS NEEDED
Status: DISCONTINUED | OUTPATIENT
Start: 2021-11-30 | End: 2021-12-01 | Stop reason: HOSPADM

## 2021-11-30 RX ORDER — ONDANSETRON 2 MG/ML
4 INJECTION INTRAMUSCULAR; INTRAVENOUS EVERY 6 HOURS PRN
Status: DISCONTINUED | OUTPATIENT
Start: 2021-11-30 | End: 2021-12-01 | Stop reason: HOSPADM

## 2021-11-30 RX ORDER — PROMETHAZINE HYDROCHLORIDE 12.5 MG/1
12.5 SUPPOSITORY RECTAL EVERY 6 HOURS PRN
Status: DISCONTINUED | OUTPATIENT
Start: 2021-11-30 | End: 2021-12-01 | Stop reason: HOSPADM

## 2021-11-30 RX ORDER — PRENATAL VIT/IRON FUM/FOLIC AC 27MG-0.8MG
1 TABLET ORAL DAILY
Status: DISCONTINUED | OUTPATIENT
Start: 2021-11-30 | End: 2021-12-01 | Stop reason: HOSPADM

## 2021-11-30 RX ORDER — CALCIUM CARBONATE 200(500)MG
2 TABLET,CHEWABLE ORAL 3 TIMES DAILY PRN
Status: DISCONTINUED | OUTPATIENT
Start: 2021-11-30 | End: 2021-12-01 | Stop reason: HOSPADM

## 2021-11-30 RX ORDER — ONDANSETRON 4 MG/1
4 TABLET, FILM COATED ORAL EVERY 8 HOURS PRN
Status: DISCONTINUED | OUTPATIENT
Start: 2021-11-30 | End: 2021-12-01 | Stop reason: HOSPADM

## 2021-11-30 RX ORDER — ONDANSETRON 4 MG/1
4 TABLET, FILM COATED ORAL EVERY 6 HOURS PRN
Status: DISCONTINUED | OUTPATIENT
Start: 2021-11-30 | End: 2021-11-30

## 2021-11-30 RX ORDER — ONDANSETRON 2 MG/ML
4 INJECTION INTRAMUSCULAR; INTRAVENOUS EVERY 6 HOURS PRN
Status: DISCONTINUED | OUTPATIENT
Start: 2021-11-30 | End: 2021-11-30

## 2021-11-30 RX ORDER — SODIUM CHLORIDE 0.9 % (FLUSH) 0.9 %
1-10 SYRINGE (ML) INJECTION AS NEEDED
Status: DISCONTINUED | OUTPATIENT
Start: 2021-11-30 | End: 2021-12-01 | Stop reason: HOSPADM

## 2021-11-30 RX ORDER — PROMETHAZINE HYDROCHLORIDE 25 MG/1
25 TABLET ORAL EVERY 6 HOURS PRN
Status: DISCONTINUED | OUTPATIENT
Start: 2021-11-30 | End: 2021-12-01 | Stop reason: HOSPADM

## 2021-11-30 RX ADMIN — OXYTOCIN-SODIUM CHLORIDE 0.9% IV SOLN 30 UNIT/500ML 250 ML/HR: 30-0.9/5 SOLUTION at 10:15

## 2021-11-30 RX ADMIN — SODIUM CHLORIDE, POTASSIUM CHLORIDE, SODIUM LACTATE AND CALCIUM CHLORIDE 125 ML/HR: 600; 310; 30; 20 INJECTION, SOLUTION INTRAVENOUS at 08:24

## 2021-11-30 RX ADMIN — PROMETHAZINE HYDROCHLORIDE 25 MG: 25 INJECTION INTRAMUSCULAR; INTRAVENOUS at 05:05

## 2021-11-30 RX ADMIN — PRAMOXINE HYDROCHLORIDE HYDROCORTISONE ACETATE 1 APPLICATION: 100; 100 AEROSOL, FOAM TOPICAL at 18:27

## 2021-11-30 RX ADMIN — ALPRAZOLAM 0.5 MG: 0.5 TABLET ORAL at 00:16

## 2021-11-30 RX ADMIN — HYDROCODONE BITARTRATE AND ACETAMINOPHEN 1 TABLET: 5; 325 TABLET ORAL at 14:06

## 2021-11-30 RX ADMIN — DOCUSATE SODIUM 100 MG: 100 CAPSULE, LIQUID FILLED ORAL at 20:40

## 2021-11-30 RX ADMIN — IBUPROFEN 600 MG: 600 TABLET ORAL at 18:27

## 2021-11-30 RX ADMIN — BENZOCAINE AND LEVOMENTHOL: 200; 5 SPRAY TOPICAL at 18:27

## 2021-11-30 RX ADMIN — IBUPROFEN 600 MG: 600 TABLET, FILM COATED ORAL at 10:31

## 2021-11-30 RX ADMIN — HYDROCORTISONE 2.5% 1 APPLICATION: 25 CREAM TOPICAL at 18:26

## 2021-12-01 VITALS
RESPIRATION RATE: 18 BRPM | TEMPERATURE: 97.7 F | HEART RATE: 88 BPM | SYSTOLIC BLOOD PRESSURE: 120 MMHG | DIASTOLIC BLOOD PRESSURE: 73 MMHG | OXYGEN SATURATION: 98 %

## 2021-12-01 LAB
HCT VFR BLD AUTO: 31.3 % (ref 34–46.6)
HGB BLD-MCNC: 10.5 G/DL (ref 12–15.9)

## 2021-12-01 PROCEDURE — 85018 HEMOGLOBIN: CPT | Performed by: OBSTETRICS & GYNECOLOGY

## 2021-12-01 PROCEDURE — 85014 HEMATOCRIT: CPT | Performed by: OBSTETRICS & GYNECOLOGY

## 2021-12-01 RX ORDER — HYDROCODONE BITARTRATE AND ACETAMINOPHEN 5; 325 MG/1; MG/1
1 TABLET ORAL EVERY 4 HOURS PRN
Qty: 10 TABLET | Refills: 0 | Status: SHIPPED | OUTPATIENT
Start: 2021-12-01 | End: 2021-12-14

## 2021-12-01 RX ORDER — ALPRAZOLAM 0.5 MG/1
0.5 TABLET ORAL 3 TIMES DAILY PRN
Qty: 10 TABLET | Refills: 0 | Status: SHIPPED | OUTPATIENT
Start: 2021-12-01 | End: 2022-07-01

## 2021-12-01 RX ADMIN — HYDROCODONE BITARTRATE AND ACETAMINOPHEN 1 TABLET: 5; 325 TABLET ORAL at 03:49

## 2021-12-01 RX ADMIN — PRENATAL VIT W/ FE FUMARATE-FA TAB 27-0.8 MG 1 TABLET: 27-0.8 TAB at 08:26

## 2021-12-01 RX ADMIN — IBUPROFEN 600 MG: 600 TABLET ORAL at 08:36

## 2021-12-01 RX ADMIN — DOCUSATE SODIUM 100 MG: 100 CAPSULE, LIQUID FILLED ORAL at 08:26

## 2021-12-01 RX ADMIN — HYDROCODONE BITARTRATE AND ACETAMINOPHEN 1 TABLET: 5; 325 TABLET ORAL at 08:36

## 2021-12-01 RX ADMIN — PRAMOXINE HYDROCHLORIDE HYDROCORTISONE ACETATE 1 APPLICATION: 100; 100 AEROSOL, FOAM TOPICAL at 11:09

## 2021-12-01 NOTE — PLAN OF CARE
Goal Outcome Evaluation:  Plan of Care Reviewed With: patient, spouse           Outcome Summary: VSS, FF/ML/U1, scant lochia, voiding and ambulating, PO pain medication controlling pain and ice packs and dermoplast being used for laceration, baby remained in room throughout the night on the cooling blanket, emotional support provided and questions answered, slept/rested throughout the night

## 2021-12-01 NOTE — PROGRESS NOTES
KIRSTY Cheema  [facility]  [address]  [place] [address]  Office [phone]  Fax [phone]    [facility]  Vaginal Delivery Progress Note    Subjective   Postpartum Day 1: Vaginal Delivery    The patient feels tired, but states she would like to go home today.  Her pain is well controlled with Norco and Ibuprofen..   She is ambulating well.  Patient describes her bleeding as light lochia.    Infant remains on cooling mattress in room with mother.    Objective     Vital Signs Range for the last 24 hours  Temperature: Temp:  [97.8 °F (36.6 °C)-98.6 °F (37 °C)] 97.9 °F (36.6 °C)   Temp Source: Temp src: Temporal   BP: BP: (109-136)/(62-94) 113/72   Pulse: Heart Rate:  [] 96   Respirations: Resp:  [16-18] 18   SPO2: SpO2:  [96 %-97 %] 96 %   O2 Amount (l/min):     O2 Devices Device (Oxygen Therapy): room air   Weight:       Admit Height:         Physical Exam:  General:  no acute distresss.  Abdomen: abdomen is soft without significant tenderness, masses, organomegaly or guarding. Fundus: appropriate, firm, non tender  Extremities: normal, atraumatic, no cyanosis, and trace edema.       Lab results reviewed:  Yes   Rubella:  No results found for: RUBELLAIGGIN Nurse Transcribed from prenatal record --  No components found for: EXTRUBELQUAL  Rh Status:    RH type   Date Value Ref Range Status   11/29/2021 Positive  Final     Immunizations:   Immunization History   Administered Date(s) Administered   • Tdap 10/01/2021     Lab Results (last 24 hours)     Procedure Component Value Units Date/Time    Hemoglobin & Hematocrit, Blood [[id number]]  (Abnormal) Collected: 12/01/21 0610    Specimen: Blood Updated: 12/01/21 0635     Hemoglobin 10.5 g/dL      Hematocrit 31.3 %     Tissue Pathology Exam [[id number]] Collected: 11/30/21 1155    Specimen: Tissue from Placenta Updated: 11/30/21 1523          External Prenatal Results     Pregnancy Outside Results - Transcribed From Office Records -  See Scanned Records For Details     Test Value Date Time    ABO  O  11/29/21 1303    Rh  Positive  11/29/21 1303    Antibody Screen  Negative  11/29/21 1303       Negative  04/22/21 1342    Varicella IgG       Rubella  18.30 index 04/22/21 1342    Hgb  10.5 g/dL 12/01/21 0610       12.3 g/dL 11/29/21 1303       11.2 g/dL 09/02/21 1323       13.2 g/dL 04/22/21 1342    Hct  31.3 % 12/01/21 0610       37.3 % 11/29/21 1303       39.9 % 04/22/21 1342    Glucose Fasting GTT  73 mg/dL 09/10/21 0946    Glucose Tolerance Test 1 hour  134 mg/dL 09/10/21 0946    Glucose Tolerance Test 3 hour  132 mg/dL 09/10/21 0946    Gonorrhea (discrete)  Negative  11/04/21 1406       Negative  04/22/21 1342    Chlamydia (discrete)  Negative  11/04/21 1406       Negative  04/22/21 1342    RPR  Non Reactive  04/22/21 1342    VDRL       Syphilis Antibody       HBsAg  Negative  04/22/21 1342    Herpes Simplex Virus PCR       Herpes Simplex VIrus Culture       HIV  Non Reactive  04/22/21 1342    Hep C RNA Quant PCR       Hep C Antibody       AFP       Group B Strep  Negative  11/04/21 1406    GBS Susceptibility to Clindamycin       GBS Susceptibility to Erythromycin       Fetal Fibronectin       Genetic Testing, Maternal Blood             Drug Screening     Test Value Date Time    Urine Drug Screen       Amphetamine Screen       Barbiturate Screen       Benzodiazepine Screen       Methadone Screen       Phencyclidine Screen       Opiates Screen       THC Screen       Cocaine Screen       Propoxyphene Screen       Buprenorphine Screen       Methamphetamine Screen       Oxycodone Screen       Tricyclic Antidepressants Screen             Legend    ^: Historical                        Assessment/Plan       Fetal demise, greater than 22 weeks, antepartum      Duran Lay is Day 1  post-partum  Vaginal, Spontaneous   .      Plan:   1. Continue current care   2. Discharge home with standard precautions and return to clinic in 1-2 weeks with either   June or Ema Blank CNM.     This note has been signed electronically.    KIRSTY Cheema  12/1/2021  07:36 CST

## 2021-12-01 NOTE — LACTATION NOTE
Suppression / Bereaved Mother    Educated mother on suppression of breast milk per her choice. Handout on same given. Instructed her to avoid all nipple stimulation or pumping; to use comfort measures if breast become full; ice packs as needed. Urged wearing a tight fitting bra day/night for 10 days. Also suggested peppermint as a milk drying agent. Mother voiced understanding.

## 2021-12-01 NOTE — NURSING NOTE
Message left with Dr Pena office x4 for follow up appointment.  Pt is wishing to leave last message asked the office to make the appointment and pt will check her my chart for date and time

## 2021-12-01 NOTE — SIGNIFICANT NOTE
Bereavement Note    Grief Response/Support  Consulted with parents this AM. Mother holding swaddled infant in arms. All 3 in bed. Mother stroking infant's head lovingly at times. Parents contemplative, slightly tearful at times. Parents perusing items in Memory Box. Duran leafed through hand/footprints obtained by staff, mentioning how she planned to turn these into keepsakes.   Gave her handout on resources for obtaining other keepsake/mementos, if she wished. Also explained [employer] heartbeat bears; local benevolent service to bereaved families that give stuffed bears to parents.The bear contains recording of infant's own heartbeat. Parent reports they do have a recording that could be used. Contact card for [employer] given.   Handout also given for local grief support groups as well as online support groups.      Plans  Duran reports infant to have service at [place] and [place] followed by cremation. Duran plans to use ashes and have jewelry or other creative memento made.    Spiritual  Parents report have own clergy and Gnosticism. All are aware of loss, supporting them with prayer, and family feels they have their support.     Milk Donation Option  Informed Duran of option of pumping and donating milk, if she wished. Acknowledged that it is a highly personal decision and varies from mother-to-mother; that for some it can be therapeutic and aids the process of grieving; for others it does not promote comforting feelings, and suppression of milk is best for them. Duarn feels suppression is her choice. Affirmed her in this.

## 2021-12-01 NOTE — DISCHARGE SUMMARY
[facility]    Ema LAM Rosamaria, APRN  [address]  [place] [address]  [phone]      Discharge Summary     Axel Baires  : 1995  MRN: [id number]  CSN: [id number]    Date of Admission: 2021   Date of Discharge:  2021   Delivering Physician: Jeremy Watkins        Admission Diagnosis: 1. Fetal demise, greater than 22 weeks, antepartum [O36.4XX0]   Discharge Diagnosis: 1. Pregnancy at 40w1d - delivered       Procedures: 2021  - Vaginal, Spontaneous       Hospital Course  Patient is a 26 y.o.  who at 40w1d had a vaginal birth.  Her postpartum course was without complications.  On PPD #2 she was ready for discharge.  She had normal lochia and pain well controlled with oral medications.    Infant  child  fetus weighing 2948 g (6 lb 8 oz)   Apgars -  0 @ 1 minute /  0 @ 5 minutes.    Discharge labs  Lab Results   Component Value Date    WBC 10.07 2021    HGB 10.5 (L) 2021    HCT 31.3 (L) 2021     2021       Discharge Medications     Discharge Medications      New Medications      Instructions Start Date   ALPRAZolam 0.5 MG tablet  Commonly known as: XANAX   0.5 mg, Oral, 3 Times Daily PRN      HYDROcodone-acetaminophen 5-325 MG per tablet  Commonly known as: NORCO   1 tablet, Oral, Every 4 Hours PRN         Continue These Medications      Instructions Start Date   MIRALAX PO   Oral      prenatal (CLASSIC) vitamin  tablet  Generic drug: prenatal vitamin   Oral, Daily             External Prenatal Results     Pregnancy Outside Results - Transcribed From Office Records - See Scanned Records For Details     Test Value Date Time    ABO  O  21 1303    Rh  Positive  21 1303    Antibody Screen  Negative  21 1303       Negative  21 1342    Varicella IgG       Rubella  18.30 index 21 1342    Hgb  10.5 g/dL 21 0610       12.3 g/dL 21 1303       11.2 g/dL 21 1323       13.2 g/dL  04/22/21 1342    Hct  31.3 % 12/01/21 0610       37.3 % 11/29/21 1303       39.9 % 04/22/21 1342    Glucose Fasting GTT  73 mg/dL 09/10/21 0946    Glucose Tolerance Test 1 hour  134 mg/dL 09/10/21 0946    Glucose Tolerance Test 3 hour  132 mg/dL 09/10/21 0946    Gonorrhea (discrete)  Negative  11/04/21 1406       Negative  04/22/21 1342    Chlamydia (discrete)  Negative  11/04/21 1406       Negative  04/22/21 1342    RPR  Non Reactive  04/22/21 1342    VDRL       Syphilis Antibody       HBsAg  Negative  04/22/21 1342    Herpes Simplex Virus PCR       Herpes Simplex VIrus Culture       HIV  Non Reactive  04/22/21 1342    Hep C RNA Quant PCR       Hep C Antibody       AFP       Group B Strep  Negative  11/04/21 1406    GBS Susceptibility to Clindamycin       GBS Susceptibility to Erythromycin       Fetal Fibronectin       Genetic Testing, Maternal Blood             Drug Screening     Test Value Date Time    Urine Drug Screen       Amphetamine Screen       Barbiturate Screen       Benzodiazepine Screen       Methadone Screen       Phencyclidine Screen       Opiates Screen       THC Screen       Cocaine Screen       Propoxyphene Screen       Buprenorphine Screen       Methamphetamine Screen       Oxycodone Screen       Tricyclic Antidepressants Screen             Legend    ^: Historical                        Discharge Disposition Home or Self Care   Condition on Discharge: good   Follow-up: 2 weeks with Dr. Waktins     Plan for discharge reviewed with Dr. Watkins    This note has been signed electronically.    Ema Blank, DNP, APRN, CNM, RNC-OB  12/1/2021

## 2021-12-01 NOTE — LACTATION NOTE
Bereavement Note    Grief Response/Support  Consulted with parents this AM. Mother holding swaddled infant in arms. All 3 in bed. Mother stroking infant's head lovingly at times. Parents contemplative, slightly tearful at times. Parents perusing items in Memory Box. Duran leafed through hand/footprints obtained by staff, mentioning how she planned to turn these into keepsakes.   Gave her handout on resources for obtaining other keepsake/mementos, if she wished. Also explained [employer] heartbeat bears; local benevolent service to bereaved families that give stuffed bears to parents.The bear contains recording of infant's own heartbeat. Parent reports they do have a recording that could be used. Contact card for [employer] given.   Handout also given for local grief support groups as well as online support groups.      Plans  Duran reports infant to have service at [place] and [place] followed by cremation. Duran plans to use ashes and have jewelry or other creative memento made.    Spiritual  Parents report have own clergy and Taoist. All are aware of loss, supporting them with prayer, and family feels they have their support.     Milk Donation Option  Informed Duran of option of pumping and donating milk, if she wished. Acknowledged that it is a highly personal decision and varies from mother-to-mother; that for some it can be therapeutic and aids the process of grieving; for others it does not promote comforting feelings, and suppression of milk is best for them. Duran feels suppression is her choice. Affirmed her in this.

## 2021-12-03 LAB
CYTO UR: NORMAL
LAB AP CASE REPORT: NORMAL
LAB AP CLINICAL INFORMATION: NORMAL
PATH REPORT.FINAL DX SPEC: NORMAL
PATH REPORT.GROSS SPEC: NORMAL

## 2021-12-06 ENCOUNTER — POSTPARTUM VISIT (OUTPATIENT)
Dept: OBSTETRICS AND GYNECOLOGY | Facility: CLINIC | Age: 26
End: 2021-12-06

## 2021-12-06 ENCOUNTER — TELEPHONE (OUTPATIENT)
Dept: OBSTETRICS AND GYNECOLOGY | Facility: CLINIC | Age: 26
End: 2021-12-06

## 2021-12-06 VITALS
HEIGHT: 65 IN | SYSTOLIC BLOOD PRESSURE: 118 MMHG | WEIGHT: 212 LBS | BODY MASS INDEX: 35.32 KG/M2 | DIASTOLIC BLOOD PRESSURE: 82 MMHG

## 2021-12-06 PROCEDURE — 0503F POSTPARTUM CARE VISIT: CPT | Performed by: OBSTETRICS & GYNECOLOGY

## 2021-12-06 RX ORDER — IBUPROFEN 200 MG
200 TABLET ORAL EVERY 6 HOURS PRN
COMMUNITY
End: 2022-02-08

## 2021-12-06 NOTE — PROGRESS NOTES
"Duran Baires is a 26 y.o. female here today for possible perineal infection.  She had a vaginal delivery on November 29, where she sustained a stellate second-degree perineal laceration.  The repair was uncomplicated, but she reports over the last 1 to 2 days that she has had increased pain and malodorous drainage.  She denies fevers.    Visit Vitals  /82 (BP Location: Right arm, Patient Position: Sitting)   Ht 165.1 cm (65\")   Wt 96.2 kg (212 lb)   BMI 35.28 kg/m²     Pleasant female no acute distress  Mood and affect normal  Breathing unlabored  Abdomen nontender  On pelvic exam the perineum is intact but the distal vaginal mucosal portion of the repair has broken down.  The edges are somewhat necrotic but there is no erythema, induration, or purulent drainage.    Assessment: Obstetric laceration with superficial breakdown    I recommend that she begin sitz baths 2-3 times a day at home.  She will follow-up in 1 week to recheck her perineum.  In the meantime if she has worsening pain, or additional symptoms she will contact the office for repeat evaluation.      "

## 2021-12-06 NOTE — TELEPHONE ENCOUNTER
Pt c/o increased pain and discharge at post delivery vaginal repair site.  Pt states she is concerned she has an infection.  Pt asking to be seen in office sooner than her next appt 12/14/21.  Rayna TEAGUE updated and pt given appt for today at 1pm.  Pt voiced understanding.

## 2021-12-14 ENCOUNTER — POSTPARTUM VISIT (OUTPATIENT)
Dept: OBSTETRICS AND GYNECOLOGY | Facility: CLINIC | Age: 26
End: 2021-12-14

## 2021-12-14 VITALS
WEIGHT: 207 LBS | DIASTOLIC BLOOD PRESSURE: 86 MMHG | SYSTOLIC BLOOD PRESSURE: 122 MMHG | BODY MASS INDEX: 34.49 KG/M2 | HEIGHT: 65 IN

## 2021-12-14 PROBLEM — Z34.03 ENCOUNTER FOR SUPERVISION OF NORMAL FIRST PREGNANCY IN THIRD TRIMESTER: Status: RESOLVED | Noted: 2021-09-02 | Resolved: 2021-12-14

## 2021-12-14 PROCEDURE — 0503F POSTPARTUM CARE VISIT: CPT | Performed by: OBSTETRICS & GYNECOLOGY

## 2021-12-14 NOTE — PROGRESS NOTES
"Duran Baires is a 26 y.o. female here today for a postpartum visit after a vaginal delivery on November 29 of a fetal demise.  She sustained a stellate second-degree perineal laceration and was seen a week ago with a superficial breakdown.  Over the last week, she has been performing sitz baths and reports that she is doing much better.  Her pain has significantly improved and her discharge has stopped.  She denies fevers.    Visit Vitals  /86 (BP Location: Right arm, Patient Position: Sitting, Cuff Size: Large Adult)   Ht 165.1 cm (65\")   Wt 93.9 kg (207 lb)   Breastfeeding No   BMI 34.45 kg/m²     Pleasant female no acute distress  Mood and affect normal  Breathing unlabored  Her perineum is intact.  The distal vaginal mucosa appears to be granulating well without signs of infection.    MicroArray analysis has returned showing normal male genetics    Assessment: Obstetric laceration with superficial breakdown     I recommend that she continue sitz baths at home.  She will follow-up in 1 month for a postpartum exam and to recheck her perineum.  We will then be able to arrange a hypercoagulability work-up.  In the meantime if she has worsening pain, or additional symptoms she will contact the office for repeat evaluation.      "

## 2022-01-11 ENCOUNTER — POSTPARTUM VISIT (OUTPATIENT)
Dept: OBSTETRICS AND GYNECOLOGY | Facility: CLINIC | Age: 27
End: 2022-01-11

## 2022-01-11 VITALS
SYSTOLIC BLOOD PRESSURE: 122 MMHG | WEIGHT: 206 LBS | DIASTOLIC BLOOD PRESSURE: 92 MMHG | BODY MASS INDEX: 34.32 KG/M2 | HEIGHT: 65 IN

## 2022-01-11 DIAGNOSIS — Z87.59 HISTORY OF FETAL DEMISE, NOT CURRENTLY PREGNANT: ICD-10-CM

## 2022-01-11 PROBLEM — O36.4XX0 FETAL DEMISE, GREATER THAN 22 WEEKS, ANTEPARTUM: Status: RESOLVED | Noted: 2021-11-29 | Resolved: 2022-01-11

## 2022-01-11 PROCEDURE — 0503F POSTPARTUM CARE VISIT: CPT | Performed by: OBSTETRICS & GYNECOLOGY

## 2022-01-11 NOTE — PROGRESS NOTES
"Duran Baires is here for a postpartum visit after a vaginal delivery of IUFD 6 weeks ago.  The depression questionnaire has been completed.  She has signs of postpartum depression today.  She describes her feelings as more of anxiety about leaving the house due to worry about encountering things that will make her think of her baby.  She has not had a period since her delivery.  Her last Pap smear was 5/2020 and normal.  She has no history of cervical dysplasia.  She plans on using condoms for contraception at this time.    /92 (BP Location: Left arm, Patient Position: Sitting)   Ht 165.1 cm (65\")   Wt 93.4 kg (206 lb)   LMP 01/05/2022 (Exact Date)   BMI 34.28 kg/m²    In general pleasant female no acute distress  Neck no thyromegaly  Breasts without erythema tenderness or masses  Abdomen soft and nontender  Her stellate second-degree perineal laceration is well-healed and approximated.  There is some firmness in the midline of the posterior fourchette that is nontender.    Assessment: postpartum exam after fetal demise, situational depression    I recommend that she seek out counseling and support group.  She does not feel that she needs medication at this time.  We have discussed current Pap smear screening guidelines.  I have ordered a thrombophilia panel to be performed in 2 weeks to complete her evaluation of the fetal demise.  She will return to the office in 4 weeks to recheck her mood and call in the meantime if she has questions or concerns.    "

## 2022-02-08 ENCOUNTER — OFFICE VISIT (OUTPATIENT)
Dept: OBSTETRICS AND GYNECOLOGY | Facility: CLINIC | Age: 27
End: 2022-02-08

## 2022-02-08 VITALS
BODY MASS INDEX: 34.49 KG/M2 | WEIGHT: 207 LBS | SYSTOLIC BLOOD PRESSURE: 126 MMHG | DIASTOLIC BLOOD PRESSURE: 74 MMHG | HEIGHT: 65 IN

## 2022-02-08 DIAGNOSIS — F32.9 REACTIVE DEPRESSION: ICD-10-CM

## 2022-02-08 DIAGNOSIS — Z87.59 HISTORY OF FETAL DEMISE, NOT CURRENTLY PREGNANT: Primary | ICD-10-CM

## 2022-02-08 PROCEDURE — 99213 OFFICE O/P EST LOW 20 MIN: CPT | Performed by: OBSTETRICS & GYNECOLOGY

## 2022-02-08 NOTE — PROGRESS NOTES
"Duran Baires is a 27 y.o. female here today for follow-up of a fetal demise and subsequent reactive depression.  Since her last visit she has seen a bereavement counselor and feels like her mood is doing better.  She has completed her laboratory evaluation for thrombophilia.  She does not wish to be on contraception and is considering another pregnancy within the year.  She continues to take a prenatal vitamin, and 0.25 mg Xanax as needed.    Visit Vitals  /74 (BP Location: Left arm, Patient Position: Sitting, Cuff Size: Adult)   Ht 165.1 cm (65\")   Wt 93.9 kg (207 lb)   LMP 01/31/2022 (Exact Date)   Breastfeeding No   BMI 34.45 kg/m²     Pleasant female no acute distress  Mood and affect normal  Breathing unlabored    We reviewed her normal thrombophilia labs which are remarkable only for heterozygous MTHFR mutation which is not associated with increased risk of thrombosis  Last Pap smear May 2020 was normal    Assessment: History of fetal demise, reactive depression    She will continue her prenatal vitamin, and we discussed the recommendation to wait at least 6 months between pregnancies.  We briefly discussed management of her next pregnancy given her history of fetal demise with the most likely explanation being the tight triple nuchal cord.  She did require Clomid to ovulate and conceive last time, so she will call the office if she feels that that is necessary again.  She will follow-up in 1 year or sooner if needed.  Call with questions or concerns.      "

## 2022-02-16 LAB
APTT HEX PL PPP: 0 SEC
APTT IMM NP PPP: NORMAL SEC
APTT PPP 1:1 SALINE: NORMAL SEC
APTT PPP: 29.3 SEC
AT III ACT/NOR PPP CHRO: 106 % (ref 75–135)
AT III AG ACT/NOR PPP IA: 73 % (ref 72–124)
B2 GLYCOPROT1 IGA SER-ACNC: <10 SAU
B2 GLYCOPROT1 IGG SER-ACNC: <10 SGU
B2 GLYCOPROT1 IGM SER-ACNC: <10 SMU
CARDIOLIPIN IGG SER IA-ACNC: <10 GPL
CARDIOLIPIN IGM SER IA-ACNC: 18 MPL
CONFIRM APTT: 0.8 SEC
CONFIRM DRVVT: NORMAL SEC
DRVVT SCREEN TO CONFIRM RATIO: NORMAL RATIO
F2 GENE MUT ANL BLD/T: NORMAL
FACT V ACT/NOR PPP: 99 % (ref 70–150)
INR PPP: 1 RATIO
LABORATORY COMMENT REPORT: NORMAL
MTHFR GENE MUT ANL BLD/T: NORMAL
PROT C AG ACT/NOR PPP IA: 102 % (ref 60–150)
PROT S AG ACT/NOR PPP IA: 96 % (ref 60–150)
PROT S FREE AG ACT/NOR PPP IA: 139 % (ref 61–136)
PROTHROMBIN TIME: 10.6 SEC
SCREEN DRVVT: 33.3 SEC
THROMBIN TIME: 18.5 SEC

## 2022-07-01 ENCOUNTER — INITIAL PRENATAL (OUTPATIENT)
Dept: OBSTETRICS AND GYNECOLOGY | Facility: CLINIC | Age: 27
End: 2022-07-01

## 2022-07-01 VITALS — SYSTOLIC BLOOD PRESSURE: 118 MMHG | DIASTOLIC BLOOD PRESSURE: 84 MMHG | BODY MASS INDEX: 33.95 KG/M2 | WEIGHT: 204 LBS

## 2022-07-01 DIAGNOSIS — Z78.9 NON-SMOKER: ICD-10-CM

## 2022-07-01 DIAGNOSIS — O09.291 PRIOR PREGNANCY WITH FETAL DEMISE, ANTEPARTUM, FIRST TRIMESTER: ICD-10-CM

## 2022-07-01 DIAGNOSIS — Z34.81 PRENATAL CARE, SUBSEQUENT PREGNANCY, FIRST TRIMESTER: Primary | ICD-10-CM

## 2022-07-01 DIAGNOSIS — F41.9 ANXIETY: ICD-10-CM

## 2022-07-01 DIAGNOSIS — O21.9 NAUSEA AND VOMITING OF PREGNANCY, ANTEPARTUM: ICD-10-CM

## 2022-07-01 PROBLEM — Z34.90 PREGNANCY: Status: ACTIVE | Noted: 2022-07-01

## 2022-07-01 PROCEDURE — 0501F PRENATAL FLOW SHEET: CPT | Performed by: ADVANCED PRACTICE MIDWIFE

## 2022-07-01 RX ORDER — BUSPIRONE HYDROCHLORIDE 5 MG/1
5 TABLET ORAL 3 TIMES DAILY
Qty: 90 TABLET | Refills: 3 | Status: SHIPPED | OUTPATIENT
Start: 2022-07-01

## 2022-07-01 NOTE — PATIENT INSTRUCTIONS
Doxylamine (Unisom) 1/2 or 1/4 tablet plus Vitamin B6 25 mg every 6 hours as needed for pregnancy nausea.  Take together for best result.

## 2022-07-01 NOTE — PROGRESS NOTES
27-year old patient arrived to initiate prenatal care.     HPI: 27-year old . Patient's last menstrual period was 2022. Reports feeling anxious with pregnancy. She also reports regular nausea with episodes of vomiting. Pre-pregnancy weight of 204 pounds.    Previous prenatal history significant for: full-term fetal demise  History significant for: depression, PCOS, UTIs    The following portion of the patient's history were reviewed and updated as needed: allergies, current medications, past family history, past medical history, social history, surgical history, and problem list.    ROS: All systems reviewed and are negative with exception of the following: anxious, nausea, vomiting, breast tenderness, fatigue      US ordered today, reviewed and shows IUP of 8w1d gestation and Estimated Date of Delivery: 23  Last Pap Smear: 2020 NILM    Exam:  Wt: 204 lb for TWG of 0 kg (0 lb), B/P 118/84, FHTs 167   General Appearance:  healthy-appearing .  HEENT:  NCAT, EOMI, neck supple, no thyroidmegaly.  HR str and reg. No murmur. Lungs clear. Resp even and unlabored.  Abd: Soft, nontender. Bowel sounds active. Uterus is consistent with EGA.  Ext: NT, nontender. No cyanosis or edema.    Diagnoses and all orders for this visit:    1. Prenatal care, subsequent pregnancy, first trimester (Primary)  - Reviewed information in new OB packet, including weight gain in pregnancy, OTC medications for use during pregnancy, first timester of pregnancy and discomforts, regular OB routine, ffDNA and maternal carrier screening testing.  First Trimester of Pregnancy video included in AVS.  - Advised to maintain regular activity.  - Reviewed and encouraged pt to report vaginal bleeding, pelvic pain or cramping, any prolonged illness or infection, or any other concerns.  - RTO in 4 weeks with Dr. Watkins and as needed with concerns  - Discussed and ordered initial prenatal labs today:  -     ABO / Rh  -      Ambulatory Referral to Perinatology  -     Antibody Screen  -     Chlamydia trachomatis, Neisseria gonorrhoeae, PCR - Urine, Urine, Clean Catch  -     CBC & Differential  -     Hepatitis B Surface Antigen  -     HIV-1 / O / 2 Ag / Antibody 4th Generation  -     RPR  -     Rubella Antibody, IgG  -     ToxASSURE Select 13 (MW) - Urine, Clean Catch  -     Urine Culture - Urine, Urine, Clean Catch  -     Hepatitis C Antibody    2. Nausea and vomiting of pregnancy, antepartum  - Discussed nonpharmacologic nausea relief measures, including small frequent meals, dry crackers upon rising, carbonated beverages, food/drink containing ginger (ginger ale, tea, gum), avoidance of triggers such as smells, accupressure wrist bands, rest, pregnancy pops, sour candies, aromatherapy. Morning Sickness education included in the AVS.  - Encouraged adequate hydration. Discussed signs of dehydration.  - Counseled on OTC pharmacologic tx with Vitamin B6 25 mg po and Doxylamine and instructions included in the AVS.  - Call if symptoms fail to improve or worsen.    3. Prior pregnancy with fetal demise, antepartum, first trimester  - Plan for U/S with next visit.    4. Anxiety  - Discussed measures of support, including accessing healthy support system, counseling, mindfulness techniques, massage, exercise, aromatherapy, pharmacology options. Patient has a healthy support system. Will start Buspar as needed up to three times a day. Encouraged to notify provider of persistent or increased symptoms.   -     busPIRone (BUSPAR) 5 MG tablet; Take 1 tablet by mouth 3 (Three) Times a Day.  Dispense: 90 tablet; Refill: 3    5. Non-smoker      This note has been signed electronically.    Ema Blank, DNP, APRN, CNM, RNC-OB

## 2022-07-10 PROBLEM — O09.299: Status: ACTIVE | Noted: 2022-07-10

## 2022-07-10 LAB
ABO GROUP BLD: NORMAL
BACTERIA UR CULT: NORMAL
BACTERIA UR CULT: NORMAL
BASOPHILS # BLD AUTO: 0.04 10*3/MM3 (ref 0–0.2)
BASOPHILS NFR BLD AUTO: 0.5 % (ref 0–1.5)
BLD GP AB SCN SERPL QL: NEGATIVE
C TRACH RRNA SPEC QL NAA+PROBE: NEGATIVE
DRUGS UR: NORMAL
EOSINOPHIL # BLD AUTO: 0.13 10*3/MM3 (ref 0–0.4)
EOSINOPHIL NFR BLD AUTO: 1.7 % (ref 0.3–6.2)
ERYTHROCYTE [DISTWIDTH] IN BLOOD BY AUTOMATED COUNT: 12.6 % (ref 12.3–15.4)
HBV SURFACE AG SERPL QL IA: NEGATIVE
HCT VFR BLD AUTO: 36.8 % (ref 34–46.6)
HCV AB S/CO SERPL IA: 0.1 S/CO RATIO (ref 0–0.9)
HGB BLD-MCNC: 12.7 G/DL (ref 12–15.9)
HIV 1+2 AB+HIV1 P24 AG SERPL QL IA: NON REACTIVE
IMM GRANULOCYTES # BLD AUTO: 0.02 10*3/MM3 (ref 0–0.05)
IMM GRANULOCYTES NFR BLD AUTO: 0.3 % (ref 0–0.5)
LYMPHOCYTES # BLD AUTO: 2.28 10*3/MM3 (ref 0.7–3.1)
LYMPHOCYTES NFR BLD AUTO: 29.6 % (ref 19.6–45.3)
MCH RBC QN AUTO: 30.8 PG (ref 26.6–33)
MCHC RBC AUTO-ENTMCNC: 34.5 G/DL (ref 31.5–35.7)
MCV RBC AUTO: 89.3 FL (ref 79–97)
MONOCYTES # BLD AUTO: 0.52 10*3/MM3 (ref 0.1–0.9)
MONOCYTES NFR BLD AUTO: 6.8 % (ref 5–12)
N GONORRHOEA RRNA SPEC QL NAA+PROBE: NEGATIVE
NEUTROPHILS # BLD AUTO: 4.7 10*3/MM3 (ref 1.7–7)
NEUTROPHILS NFR BLD AUTO: 61.1 % (ref 42.7–76)
NRBC BLD AUTO-RTO: 0 /100 WBC (ref 0–0.2)
PLATELET # BLD AUTO: 258 10*3/MM3 (ref 140–450)
RBC # BLD AUTO: 4.12 10*6/MM3 (ref 3.77–5.28)
RH BLD: POSITIVE
RPR SER QL: NON REACTIVE
RUBV IGG SERPL IA-ACNC: 20.8 INDEX
WBC # BLD AUTO: 7.69 10*3/MM3 (ref 3.4–10.8)

## 2022-07-26 ENCOUNTER — ROUTINE PRENATAL (OUTPATIENT)
Dept: OBSTETRICS AND GYNECOLOGY | Facility: CLINIC | Age: 27
End: 2022-07-26

## 2022-07-26 VITALS — WEIGHT: 206 LBS | DIASTOLIC BLOOD PRESSURE: 78 MMHG | SYSTOLIC BLOOD PRESSURE: 104 MMHG | BODY MASS INDEX: 34.28 KG/M2

## 2022-07-26 DIAGNOSIS — O09.291 PRIOR PREGNANCY WITH FETAL DEMISE, ANTEPARTUM, FIRST TRIMESTER: Primary | ICD-10-CM

## 2022-07-26 LAB
GLUCOSE UR STRIP-MCNC: NEGATIVE MG/DL
PROT UR STRIP-MCNC: NEGATIVE MG/DL

## 2022-07-26 PROCEDURE — 0502F SUBSEQUENT PRENATAL CARE: CPT | Performed by: OBSTETRICS & GYNECOLOGY

## 2022-07-26 NOTE — PROGRESS NOTES
Feeling well, no nausea  Reviewed dating ultrasound  Reviewed normal prenatal labs  Discussed ffDNA, declines for now  Discussed last pregnancy, 39 week fetal demise, and alterations in management of this pregnancy including fetal testing and likely delivery at 37 weeks    Diagnoses and all orders for this visit:    1. Prior pregnancy with fetal demise, antepartum, first trimester (Primary)

## 2022-08-16 ENCOUNTER — TELEPHONE (OUTPATIENT)
Dept: OBSTETRICS AND GYNECOLOGY | Facility: CLINIC | Age: 27
End: 2022-08-16

## 2022-08-16 ENCOUNTER — ROUTINE PRENATAL (OUTPATIENT)
Dept: OBSTETRICS AND GYNECOLOGY | Facility: CLINIC | Age: 27
End: 2022-08-16

## 2022-08-16 VITALS — DIASTOLIC BLOOD PRESSURE: 62 MMHG | BODY MASS INDEX: 34.11 KG/M2 | SYSTOLIC BLOOD PRESSURE: 110 MMHG | WEIGHT: 205 LBS

## 2022-08-16 DIAGNOSIS — K59.09 OTHER CONSTIPATION: Primary | ICD-10-CM

## 2022-08-16 DIAGNOSIS — Z3A.14 14 WEEKS GESTATION OF PREGNANCY: ICD-10-CM

## 2022-08-16 LAB
GLUCOSE UR STRIP-MCNC: NEGATIVE MG/DL
PROT UR STRIP-MCNC: NEGATIVE MG/DL

## 2022-08-16 PROCEDURE — 0502F SUBSEQUENT PRENATAL CARE: CPT | Performed by: NURSE PRACTITIONER

## 2022-08-16 NOTE — TELEPHONE ENCOUNTER
Pt came to office as advised by JOSSUE Castillo last pm.  Pt called after vaginal spotting.  Pt reported to Viviana that she had a work up including US at her place of employment in [place].  Pt advised by Viviana to come to office this am and she would see her.  Pt added to Viviana's schedule but US not recommended at this time.

## 2022-08-16 NOTE — PROGRESS NOTES
Patient presents today with c/o bleeding in pregnancy. 14w5d she is O+  Bleeding occurred yesterday while at work. She was seen by a provider at [facility].   US was done that showed Single IUP identified demonstrating fetal cardiac   Activity (180), with an estimated gestational age of 14 weeks and 4 days by   femoral length.  Urine was normal  She called the on call provider last PM after passing a clot after being sent home. Instructed on pelvic rest.   She reports the clot was dark red/brown blood.   Today she is not bleeding. No cramping.    FHT today 153  B/P is 110/62  She does report constipation  She is recommended to start stool softener.   Reminded to utilize OTC medication list.      Diagnosis Plan   1. Other constipation     2. 14 weeks gestation of pregnancy       Encouraged pelvic rest. Keep routine f/u appt    Return to hospital for the following:  Immediately if your bag of water breaks, indicated by leaking from your vagina.  Immediately if any bright red vaginal bleeding  Immediately if your uterus is kailey continuously without any let up between contractions  Immediately if your are feeling an urge to push  If you have a decrease in fetal movement  If any change in your condition or that of the baby causes you to believe an emergency exists  If you have regular/painful contractions or an increase in the strength of uterine contractions

## 2022-08-18 ENCOUNTER — TELEPHONE (OUTPATIENT)
Dept: OBSTETRICS AND GYNECOLOGY | Facility: CLINIC | Age: 27
End: 2022-08-18

## 2022-08-18 NOTE — TELEPHONE ENCOUNTER
Pt called to report she is still spotting.  Pt describes bleeding as only when she wipes and light pink in color.  Pt was seen in office on 8/16/22 and had US the day before at her work.  Pt denies cramping and states bleeding is less than earlier in the week.  Pt advised to cont monitoring spotting and go to ER with any heavy bleeding.  Pt voiced understanding.  Pt next appt 08/25/22 with an US and office visit

## 2022-08-25 ENCOUNTER — ROUTINE PRENATAL (OUTPATIENT)
Dept: OBSTETRICS AND GYNECOLOGY | Facility: CLINIC | Age: 27
End: 2022-08-25

## 2022-08-25 VITALS — WEIGHT: 202 LBS | DIASTOLIC BLOOD PRESSURE: 82 MMHG | SYSTOLIC BLOOD PRESSURE: 118 MMHG | BODY MASS INDEX: 33.61 KG/M2

## 2022-08-25 DIAGNOSIS — O09.292 PRIOR PREGNANCY WITH FETAL DEMISE AND CURRENT PREGNANCY IN SECOND TRIMESTER: Primary | ICD-10-CM

## 2022-08-25 LAB
GLUCOSE UR STRIP-MCNC: NEGATIVE MG/DL
PROT UR STRIP-MCNC: ABNORMAL MG/DL

## 2022-08-25 PROCEDURE — 0502F SUBSEQUENT PRENATAL CARE: CPT | Performed by: OBSTETRICS & GYNECOLOGY

## 2022-08-25 NOTE — PROGRESS NOTES
Starting to feel fetal movement  No bleeding last few days  Feeling well  Gender peek, BOY!  Schedule anatomy US 9/27    Diagnoses and all orders for this visit:    1. Prior pregnancy with fetal demise and current pregnancy in second trimester (Primary)

## 2022-09-27 ENCOUNTER — ROUTINE PRENATAL (OUTPATIENT)
Dept: OBSTETRICS AND GYNECOLOGY | Facility: CLINIC | Age: 27
End: 2022-09-27

## 2022-09-27 VITALS — SYSTOLIC BLOOD PRESSURE: 102 MMHG | BODY MASS INDEX: 34.28 KG/M2 | WEIGHT: 206 LBS | DIASTOLIC BLOOD PRESSURE: 64 MMHG

## 2022-09-27 DIAGNOSIS — O09.292 PRIOR PREGNANCY WITH FETAL DEMISE AND CURRENT PREGNANCY IN SECOND TRIMESTER: Primary | ICD-10-CM

## 2022-09-27 PROBLEM — Z34.90 PREGNANCY: Status: RESOLVED | Noted: 2022-07-01 | Resolved: 2022-09-27

## 2022-09-27 LAB
GLUCOSE UR STRIP-MCNC: NEGATIVE MG/DL
PROT UR STRIP-MCNC: ABNORMAL MG/DL

## 2022-09-27 PROCEDURE — 0502F SUBSEQUENT PRENATAL CARE: CPT | Performed by: OBSTETRICS & GYNECOLOGY

## 2022-09-27 NOTE — PROGRESS NOTES
Good fetal movement  Has anatomy US later today  Discussed possible dizzy spells and ensuring adequate hydration    Diagnoses and all orders for this visit:    1. Prior pregnancy with fetal demise and current pregnancy in second trimester (Primary)

## 2022-10-20 ENCOUNTER — ROUTINE PRENATAL (OUTPATIENT)
Dept: OBSTETRICS AND GYNECOLOGY | Facility: CLINIC | Age: 27
End: 2022-10-20

## 2022-10-20 VITALS — SYSTOLIC BLOOD PRESSURE: 114 MMHG | WEIGHT: 210 LBS | BODY MASS INDEX: 34.95 KG/M2 | DIASTOLIC BLOOD PRESSURE: 78 MMHG

## 2022-10-20 DIAGNOSIS — O09.292 PRIOR PREGNANCY WITH FETAL DEMISE AND CURRENT PREGNANCY IN SECOND TRIMESTER: Primary | ICD-10-CM

## 2022-10-20 LAB
GLUCOSE UR STRIP-MCNC: NEGATIVE MG/DL
PROT UR STRIP-MCNC: NEGATIVE MG/DL

## 2022-10-20 PROCEDURE — 0502F SUBSEQUENT PRENATAL CARE: CPT | Performed by: OBSTETRICS & GYNECOLOGY

## 2022-10-20 NOTE — PROGRESS NOTES
Good fetal movement  Reviewed normal limited anatomy US, has repeat next week  Glucola and Hgb ordered for next visit  Has had flu vaccine  Discussed possible dizzy spells and ensuring adequate hydration    Diagnoses and all orders for this visit:    1. Prior pregnancy with fetal demise and current pregnancy in second trimester (Primary)

## 2022-11-21 ENCOUNTER — ROUTINE PRENATAL (OUTPATIENT)
Dept: OBSTETRICS AND GYNECOLOGY | Facility: CLINIC | Age: 27
End: 2022-11-21

## 2022-11-21 VITALS — DIASTOLIC BLOOD PRESSURE: 68 MMHG | WEIGHT: 217 LBS | SYSTOLIC BLOOD PRESSURE: 120 MMHG | BODY MASS INDEX: 36.11 KG/M2

## 2022-11-21 DIAGNOSIS — O09.293 PRIOR PREGNANCY WITH FETAL DEMISE AND CURRENT PREGNANCY IN THIRD TRIMESTER: Primary | ICD-10-CM

## 2022-11-21 LAB
GLUCOSE UR STRIP-MCNC: NEGATIVE MG/DL
PROT UR STRIP-MCNC: NEGATIVE MG/DL

## 2022-11-21 PROCEDURE — 0502F SUBSEQUENT PRENATAL CARE: CPT | Performed by: OBSTETRICS & GYNECOLOGY

## 2022-11-21 RX ORDER — PSEUDOEPHEDRINE HCL 30 MG
100 TABLET ORAL
COMMUNITY

## 2022-11-21 NOTE — PROGRESS NOTES
Good fetal movement  Reviewed follow up anatomy US  Glucola and Hgb today, Rh positive  Growth US next visit for prior IUFD, begin fetal testing at 34 weeks  Discuss Tdap next visit  Discussed [place] contractions    Diagnoses and all orders for this visit:    1. Prior pregnancy with fetal demise and current pregnancy in third trimester (Primary)  -     Gestational Screen 1 Hr ([facility])  -     Hemoglobin

## 2022-11-22 LAB
GLUCOSE 1H P 50 G GLC PO SERPL-MCNC: 116 MG/DL (ref 65–139)
HGB BLD-MCNC: 12 G/DL (ref 12–15.9)

## 2022-12-01 ENCOUNTER — ROUTINE PRENATAL (OUTPATIENT)
Dept: OBSTETRICS AND GYNECOLOGY | Facility: CLINIC | Age: 27
End: 2022-12-01

## 2022-12-01 VITALS — SYSTOLIC BLOOD PRESSURE: 92 MMHG | DIASTOLIC BLOOD PRESSURE: 68 MMHG | WEIGHT: 217 LBS | BODY MASS INDEX: 36.11 KG/M2

## 2022-12-01 DIAGNOSIS — O09.293 PRIOR PREGNANCY WITH FETAL DEMISE AND CURRENT PREGNANCY IN THIRD TRIMESTER: Primary | ICD-10-CM

## 2022-12-01 LAB
GLUCOSE UR STRIP-MCNC: NEGATIVE MG/DL
PROT UR STRIP-MCNC: NEGATIVE MG/DL

## 2022-12-01 PROCEDURE — 90471 IMMUNIZATION ADMIN: CPT | Performed by: OBSTETRICS & GYNECOLOGY

## 2022-12-01 PROCEDURE — 0502F SUBSEQUENT PRENATAL CARE: CPT | Performed by: OBSTETRICS & GYNECOLOGY

## 2022-12-01 PROCEDURE — 90715 TDAP VACCINE 7 YRS/> IM: CPT | Performed by: OBSTETRICS & GYNECOLOGY

## 2022-12-01 NOTE — PROGRESS NOTES
Good fetal movement  US today 26% growth, DEVEN 18.9cm  Reviewed normal Glucola and Hgb  Tdap in office today   labor precautions    Diagnoses and all orders for this visit:    1. Prior pregnancy with fetal demise and current pregnancy in third trimester (Primary)  -     Tdap Vaccine Greater Than or Equal To 8yo IM

## 2022-12-15 ENCOUNTER — ROUTINE PRENATAL (OUTPATIENT)
Dept: OBSTETRICS AND GYNECOLOGY | Facility: CLINIC | Age: 27
End: 2022-12-15

## 2022-12-15 VITALS — SYSTOLIC BLOOD PRESSURE: 118 MMHG | DIASTOLIC BLOOD PRESSURE: 82 MMHG | WEIGHT: 218 LBS | BODY MASS INDEX: 36.28 KG/M2

## 2022-12-15 DIAGNOSIS — O09.293 PRIOR PREGNANCY WITH FETAL DEMISE AND CURRENT PREGNANCY IN THIRD TRIMESTER: Primary | ICD-10-CM

## 2022-12-15 LAB
GLUCOSE UR STRIP-MCNC: NEGATIVE MG/DL
PROT UR STRIP-MCNC: ABNORMAL MG/DL

## 2022-12-15 PROCEDURE — 0502F SUBSEQUENT PRENATAL CARE: CPT | Performed by: OBSTETRICS & GYNECOLOGY

## 2022-12-15 NOTE — PROGRESS NOTES
Good fetal movement  No contractions, no reflux  Growth US next visit, Begin weekly BPP next visit   labor precautions    Diagnoses and all orders for this visit:    1. Prior pregnancy with fetal demise and current pregnancy in third trimester (Primary)

## 2022-12-29 ENCOUNTER — ROUTINE PRENATAL (OUTPATIENT)
Dept: OBSTETRICS AND GYNECOLOGY | Facility: CLINIC | Age: 27
End: 2022-12-29

## 2022-12-29 VITALS — DIASTOLIC BLOOD PRESSURE: 80 MMHG | BODY MASS INDEX: 37.11 KG/M2 | WEIGHT: 223 LBS | SYSTOLIC BLOOD PRESSURE: 120 MMHG

## 2022-12-29 DIAGNOSIS — Z78.9 NON-SMOKER: ICD-10-CM

## 2022-12-29 DIAGNOSIS — O09.293 PRIOR PREGNANCY WITH FETAL DEMISE AND CURRENT PREGNANCY IN THIRD TRIMESTER: ICD-10-CM

## 2022-12-29 DIAGNOSIS — Z34.83 PRENATAL CARE, SUBSEQUENT PREGNANCY, THIRD TRIMESTER: Primary | ICD-10-CM

## 2022-12-29 LAB
GLUCOSE UR STRIP-MCNC: NEGATIVE MG/DL
PROT UR STRIP-MCNC: NEGATIVE MG/DL

## 2022-12-29 PROCEDURE — 0502F SUBSEQUENT PRENATAL CARE: CPT | Performed by: ADVANCED PRACTICE MIDWIFE

## 2022-12-29 NOTE — PROGRESS NOTES
Reason for visit: Routine OB visit at 34w0d     CC:  27-yr old  here for routine OBV at 34w0d.  LAM 2023, by Ultrasound.  Patient reports good fetal movement and denies VB, LOF, contractions, or other concerns. Also denies h/a, visual disturbances, and epigastric pain. She received her flu shot this season.    ROS: All systems reviewed and are negative with exception of the following: anxious    Wt 223 lb for a TWG of 8.618 kg (19 lb), /80, FHTs 153, FH 34 cm  Urine today and reviewed: negative glucose, negative protein    34-week (2022) U/S: EFW 2355 g, 47%; DEVEN 16.3 cm; BPP ; normal interval growth; transverse presentation; anterior placenta  24-week (10/26/2022) Anatomy Scan: normal; anterior placenta without evidence of placenta previa    Exam:  General Appearance:  Healthy appearing . Normal mood and behavior.  HEENT: NCAT, EOMI  HR str and reg. Lungs clear. Resp even and unlabored.  Abdomen is soft and nontender. Bowel sounds active. Uterus is consistent with EGA  Ext: no edema, nontender, no trauma, or cyanosis.    Impression  Diagnoses and all orders for this visit:    1. Prenatal care, subsequent pregnancy, third trimester (Primary)  - Discussed third trimester of pregnancy, discomforts and measures of support, fetal movement counts,  labor warnings, preeclampsia warnings, and signs and symptoms to report. Signs and Symptoms of Labor and Alternative Pain Management During Labor and Delivery videos included in the AVS.  - Discussed and encouraged to come to the hospital or call with vaginal bleeding, leaking of fluid, contractions, or other concerns.  - Return to the office in one weeks for routine OBV with BPP with Dr. Watkins and as needed with concerns.    2. Prior pregnancy with fetal demise and current pregnancy in third trimester  - BPP today . Patient to return for weekly  testing with plan for delivery at 38 weeks gestation or sooner if  indicated.    3. Non-smoker          This note has been signed electronically.    Ema Blank, ZEUS, APRN, CNM, RNC-OB

## 2023-01-05 ENCOUNTER — ROUTINE PRENATAL (OUTPATIENT)
Dept: OBSTETRICS AND GYNECOLOGY | Facility: CLINIC | Age: 28
End: 2023-01-05
Payer: COMMERCIAL

## 2023-01-05 VITALS — SYSTOLIC BLOOD PRESSURE: 102 MMHG | WEIGHT: 224 LBS | DIASTOLIC BLOOD PRESSURE: 82 MMHG | BODY MASS INDEX: 37.28 KG/M2

## 2023-01-05 DIAGNOSIS — O09.293 PRIOR PREGNANCY WITH FETAL DEMISE AND CURRENT PREGNANCY IN THIRD TRIMESTER: Primary | ICD-10-CM

## 2023-01-05 LAB
GLUCOSE UR STRIP-MCNC: NEGATIVE MG/DL
PROT UR STRIP-MCNC: NEGATIVE MG/DL

## 2023-01-05 PROCEDURE — 0502F SUBSEQUENT PRENATAL CARE: CPT | Performed by: OBSTETRICS & GYNECOLOGY

## 2023-01-05 NOTE — PROGRESS NOTES
Good fetal movement  47% growth at 34 weeks  BPP today 8/8, DEVEN 18.6cm  Cervix moderate, posterior  GBS and cx's today, plan induction 37-38 weeks    Diagnoses and all orders for this visit:    1. Prior pregnancy with fetal demise and current pregnancy in third trimester (Primary)  -     Chlamydia trachomatis, Neisseria gonorrhoeae, PCR w/ confirmation - Swab, Vagina  -     Strep B Screen - Swab, Vaginal/Rectum

## 2023-01-12 ENCOUNTER — ROUTINE PRENATAL (OUTPATIENT)
Dept: OBSTETRICS AND GYNECOLOGY | Facility: CLINIC | Age: 28
End: 2023-01-12
Payer: COMMERCIAL

## 2023-01-12 VITALS — BODY MASS INDEX: 37.61 KG/M2 | WEIGHT: 226 LBS | DIASTOLIC BLOOD PRESSURE: 70 MMHG | SYSTOLIC BLOOD PRESSURE: 120 MMHG

## 2023-01-12 DIAGNOSIS — O09.293 PRIOR PREGNANCY WITH FETAL DEMISE AND CURRENT PREGNANCY IN THIRD TRIMESTER: ICD-10-CM

## 2023-01-12 DIAGNOSIS — Z34.83 PRENATAL CARE, SUBSEQUENT PREGNANCY, THIRD TRIMESTER: Primary | ICD-10-CM

## 2023-01-12 DIAGNOSIS — Z78.9 NON-SMOKER: ICD-10-CM

## 2023-01-12 DIAGNOSIS — Z3A.36 36 WEEKS GESTATION OF PREGNANCY: ICD-10-CM

## 2023-01-12 LAB
GLUCOSE UR STRIP-MCNC: NEGATIVE MG/DL
PROT UR STRIP-MCNC: NEGATIVE MG/DL

## 2023-01-12 PROCEDURE — 0502F SUBSEQUENT PRENATAL CARE: CPT | Performed by: ADVANCED PRACTICE MIDWIFE

## 2023-01-12 NOTE — PROGRESS NOTES
Reason for visit: Routine OB visit at 36w0d     CC:  27-yr old  here for routine OBV at 36w0d.  LAM 2023, by Ultrasound.  She and her  have been discussing induction, and she would like to be induced at 38w0d with Dr. Watkins. She has been having some random contractions. Patient reports good fetal movement and denies VB, LOF, or other concerns. Also denies h/a, visual disturbances, and epigastric pain. She has had the flu immunization this season through her employer.    ROS: All systems reviewed and are negative.    Wt 226 lb for a TWG of 9.979 kg (22 lb), /70, FHTs 148, FH 36 cm  Urine today and reviewed: negative glucose, negative protein    36-week (2023) U/S: BPP 8/8; DEVEN 20.62 cm; vertex presentation; anterior placenta  34-week (2022) U/S: EFW 2355 g, 47%; DEVEN 16.3 cm; BPP 8/8; normal interval growth; transverse presentation; anterior placenta  24-week (10/26/2022) Anatomy Scan: normal; anterior placenta without evidence of placenta previa    Exam:  General Appearance:  Healthy appearing . Normal mood and behavior.  HEENT: NCAT, EOMI  HR str and reg. Lungs clear. Resp even and unlabored.  Abdomen is soft and nontender. Bowel sounds active. Uterus is consistent with EGA  Ext: no edema, nontender, no trauma, or cyanosis.    Impression  Diagnoses and all orders for this visit:    1. Prenatal care, subsequent pregnancy, third trimester (Primary)  - Discussed third trimester discomforts and measures of support, fetal movement counts, signs of labor, preeclampsia warnings, and signs and symptoms to report. Signs and Symptoms of Labor and Alternative Pain Management During Labor and Delivery videos included in the AVS.  - Reviewed GBS, gonorrhea, and chlamydia cultures: negative.  - Discussed and encouraged to come to the hospital or call with vaginal bleeding, leaking of fluid, regular contractions, or other concerns.  - Return to the office in one week for routine OBV  with Dr. Watkins and as needed with concerns.    2. 36 weeks gestation of pregnancy    3. Prior pregnancy with fetal demise and current pregnancy in third trimester  - Plan for induction of labor at 38-weeks gestation.     4. Non-smoker          This note has been signed electronically.    Ema Blank, DNP, APRN, CNM, RNC-OB

## 2023-01-15 ENCOUNTER — HOSPITAL ENCOUNTER (OUTPATIENT)
Facility: HOSPITAL | Age: 28
Discharge: HOME | End: 2023-01-15
Attending: OBSTETRICS & GYNECOLOGY | Admitting: OBSTETRICS & GYNECOLOGY
Payer: COMMERCIAL

## 2023-01-15 VITALS
BODY MASS INDEX: 39.09 KG/M2 | HEIGHT: 65 IN | RESPIRATION RATE: 18 BRPM | WEIGHT: 234.6 LBS | SYSTOLIC BLOOD PRESSURE: 119 MMHG | DIASTOLIC BLOOD PRESSURE: 76 MMHG | HEART RATE: 82 BPM | TEMPERATURE: 97.8 F

## 2023-01-15 LAB
EXPIRATION DATE: NORMAL
Lab: NORMAL
PROT UR STRIP-MCNC: NEGATIVE MG/DL

## 2023-01-15 PROCEDURE — G0378 HOSPITAL OBSERVATION PER HR: HCPCS

## 2023-01-15 PROCEDURE — G0463 HOSPITAL OUTPT CLINIC VISIT: HCPCS

## 2023-01-15 PROCEDURE — 81002 URINALYSIS NONAUTO W/O SCOPE: CPT | Performed by: OBSTETRICS & GYNECOLOGY

## 2023-01-16 ENCOUNTER — TELEPHONE (OUTPATIENT)
Dept: OBSTETRICS AND GYNECOLOGY | Facility: CLINIC | Age: 28
End: 2023-01-16
Payer: COMMERCIAL

## 2023-01-16 PROBLEM — Z34.90 PREGNANCY: Status: ACTIVE | Noted: 2023-01-16

## 2023-01-16 NOTE — TELEPHONE ENCOUNTER
Called and spoke with pt re: IOL. Pt voiced that she does want induction on 1-26-23 at 38 weeks for hx of fetal demise. IOL scheduled and pt notified to arrive to [facility] on 1-26-23 at 6am. Pt understood. BS

## 2023-01-16 NOTE — NURSING NOTE
АННА OB Medical Screening Exam Score Card    1/15/2023              BOX A -  Checklist findings CRITERIA Score  BOX C -   Checklist 2nd Exam Score CRITERIA   Headache  No Yes = 1 0  Dilation 1 0-3 cm = 1  4-7 cm = 2  8-10 cm = 3   Vomiting No Yes = 1 0  Effacement 40-50 Greater than 50% = 2        Membranes:  TIME:  ROM 0 Ruptured = 3  Greater than 12 hours = 5   Visual Difficulties No Yes = 1 0  Contractions        Frequency 4-7 Less than 5 minutes   = 2    Greater than 5 minutes = 1   Epigastric Pain No Yes = 1 0  Duration 50-90 Greater than 40 seconds = 2        Intensity mild Strong = 1   TOTAL Box A 3 or more = physician or CNM exam 0  Pattern irregular Regular = 1   BOX B -   Checklist Findings CRITERIA Score  Urge to Push No Yes = 3   PARA No If in labor and   Para 11 plus = 1 0  Maternal Temp 97.8 Greater than 100.4 = 5   Duration last labor less than 3 hours No If in labor and   Yes = 3 0  Maternal /76 Greater than 140/90 = 5  OR Less than 90/50 = 5   Prior  No If in labor and  Yes = 1 0  Maternal Respiration 18 Less than 12 = 2  OR  Greater than 20 = 2   Prenatal Care Yes If in labor and  No = 3 0            Significant Medical History No Yes = 7   Prior Fetal Demise Yes 0   Maternal Trauma No Yes = 10        [place] Bleeding No Yes = 7   Multiple Gestation No If in labor and  Yes = 3 0  Fetal Heart Rate 135 Baseline less than 120 = 5    OR Greater than 160 = 5    Non-reassuring strip = 10   Cerclage, Incompetent Cervix/Uterus No If in labor and  Yes = 3 0       Urinalysis No If greater than  100 = 1 0  Fetal Position 0 Non-vertex = 3  Prolapsed part = 10   Gestational Age Yes 20-34 weeks = 3    34-36 weeks = 2    37 plus weeks = 0 2  Fetal Station 0 Greater than 0 station = 3        TOTAL Box C 7 10 or more = physician or CNM exam   TOTAL Box B 6 or more = physician or CNM exam 2         Reviewed with obstetrician and orders received.      Margarita Sun RN  1/15/2023  22:21  CST

## 2023-01-19 ENCOUNTER — ROUTINE PRENATAL (OUTPATIENT)
Dept: OBSTETRICS AND GYNECOLOGY | Facility: CLINIC | Age: 28
End: 2023-01-19
Payer: COMMERCIAL

## 2023-01-19 VITALS — DIASTOLIC BLOOD PRESSURE: 82 MMHG | BODY MASS INDEX: 37.28 KG/M2 | WEIGHT: 224 LBS | SYSTOLIC BLOOD PRESSURE: 118 MMHG

## 2023-01-19 DIAGNOSIS — O09.293 PRIOR PREGNANCY WITH FETAL DEMISE AND CURRENT PREGNANCY IN THIRD TRIMESTER: Primary | ICD-10-CM

## 2023-01-19 LAB
GLUCOSE UR STRIP-MCNC: NEGATIVE MG/DL
PROT UR STRIP-MCNC: ABNORMAL MG/DL

## 2023-01-19 PROCEDURE — 0502F SUBSEQUENT PRENATAL CARE: CPT | Performed by: OBSTETRICS & GYNECOLOGY

## 2023-01-19 NOTE — PROGRESS NOTES
Good fetal movement  Has had some contractions  BPP 8/8, DEVEN 15cm  Reviewed GBS negative  Cervix 1-2cm / thick / -3  Labor instructions, plan induction in 1 week    Diagnoses and all orders for this visit:    1. Prior pregnancy with fetal demise and current pregnancy in third trimester (Primary)

## 2023-01-22 ENCOUNTER — ANESTHESIA (OUTPATIENT)
Dept: LABOR AND DELIVERY | Facility: HOSPITAL | Age: 28
End: 2023-01-22
Payer: COMMERCIAL

## 2023-01-22 ENCOUNTER — ANESTHESIA EVENT (OUTPATIENT)
Dept: LABOR AND DELIVERY | Facility: HOSPITAL | Age: 28
End: 2023-01-22
Payer: COMMERCIAL

## 2023-01-22 ENCOUNTER — HOSPITAL ENCOUNTER (INPATIENT)
Facility: HOSPITAL | Age: 28
LOS: 2 days | Discharge: HOME | End: 2023-01-24
Attending: OBSTETRICS & GYNECOLOGY | Admitting: OBSTETRICS & GYNECOLOGY
Payer: COMMERCIAL

## 2023-01-22 PROBLEM — O36.8190 DECREASED FETAL MOVEMENT: Status: ACTIVE | Noted: 2023-01-22

## 2023-01-22 LAB
ABO GROUP BLD: NORMAL
BLD GP AB SCN SERPL QL: NEGATIVE
DEPRECATED RDW RBC AUTO: 41.9 FL (ref 37–54)
ERYTHROCYTE [DISTWIDTH] IN BLOOD BY AUTOMATED COUNT: 12.7 % (ref 12.3–15.4)
HCT VFR BLD AUTO: 35.8 % (ref 34–46.6)
HGB BLD-MCNC: 12.1 G/DL (ref 12–15.9)
MCH RBC QN AUTO: 30.9 PG (ref 26.6–33)
MCHC RBC AUTO-ENTMCNC: 33.8 G/DL (ref 31.5–35.7)
MCV RBC AUTO: 91.6 FL (ref 79–97)
PLATELET # BLD AUTO: 199 10*3/MM3 (ref 140–450)
PMV BLD AUTO: 10.4 FL (ref 6–12)
RBC # BLD AUTO: 3.91 10*6/MM3 (ref 3.77–5.28)
RH BLD: POSITIVE
T&S EXPIRATION DATE: NORMAL
WBC NRBC COR # BLD: 9.13 10*3/MM3 (ref 3.4–10.8)

## 2023-01-22 PROCEDURE — 3E033VJ INTRODUCTION OF OTHER HORMONE INTO PERIPHERAL VEIN, PERCUTANEOUS APPROACH: ICD-10-PCS | Performed by: OBSTETRICS & GYNECOLOGY

## 2023-01-22 PROCEDURE — 85027 COMPLETE CBC AUTOMATED: CPT | Performed by: OBSTETRICS & GYNECOLOGY

## 2023-01-22 PROCEDURE — 86901 BLOOD TYPING SEROLOGIC RH(D): CPT | Performed by: OBSTETRICS & GYNECOLOGY

## 2023-01-22 PROCEDURE — 86900 BLOOD TYPING SEROLOGIC ABO: CPT | Performed by: OBSTETRICS & GYNECOLOGY

## 2023-01-22 PROCEDURE — 0503F POSTPARTUM CARE VISIT: CPT | Performed by: OBSTETRICS & GYNECOLOGY

## 2023-01-22 PROCEDURE — 10907ZC DRAINAGE OF AMNIOTIC FLUID, THERAPEUTIC FROM PRODUCTS OF CONCEPTION, VIA NATURAL OR ARTIFICIAL OPENING: ICD-10-PCS | Performed by: OBSTETRICS & GYNECOLOGY

## 2023-01-22 PROCEDURE — 86850 RBC ANTIBODY SCREEN: CPT | Performed by: OBSTETRICS & GYNECOLOGY

## 2023-01-22 PROCEDURE — 51702 INSERT TEMP BLADDER CATH: CPT

## 2023-01-22 PROCEDURE — 25010000002 ROPIVACAINE PER 1 MG: Performed by: NURSE ANESTHETIST, CERTIFIED REGISTERED

## 2023-01-22 PROCEDURE — 25010000002 ONDANSETRON PER 1 MG: Performed by: OBSTETRICS & GYNECOLOGY

## 2023-01-22 PROCEDURE — 0HQ9XZZ REPAIR PERINEUM SKIN, EXTERNAL APPROACH: ICD-10-PCS | Performed by: OBSTETRICS & GYNECOLOGY

## 2023-01-22 PROCEDURE — C1755 CATHETER, INTRASPINAL: HCPCS | Performed by: NURSE ANESTHETIST, CERTIFIED REGISTERED

## 2023-01-22 PROCEDURE — 59400 OBSTETRICAL CARE: CPT | Performed by: OBSTETRICS & GYNECOLOGY

## 2023-01-22 RX ORDER — TRISODIUM CITRATE DIHYDRATE AND CITRIC ACID MONOHYDRATE 500; 334 MG/5ML; MG/5ML
30 SOLUTION ORAL ONCE
Status: DISCONTINUED | OUTPATIENT
Start: 2023-01-22 | End: 2023-01-22 | Stop reason: HOSPADM

## 2023-01-22 RX ORDER — TERBUTALINE SULFATE 1 MG/ML
0.25 INJECTION, SOLUTION SUBCUTANEOUS AS NEEDED
Status: DISCONTINUED | OUTPATIENT
Start: 2023-01-22 | End: 2023-01-22 | Stop reason: HOSPADM

## 2023-01-22 RX ORDER — SODIUM CHLORIDE 0.9 % (FLUSH) 0.9 %
10 SYRINGE (ML) INJECTION EVERY 12 HOURS SCHEDULED
Status: DISCONTINUED | OUTPATIENT
Start: 2023-01-22 | End: 2023-01-22 | Stop reason: HOSPADM

## 2023-01-22 RX ORDER — SODIUM CHLORIDE, SODIUM LACTATE, POTASSIUM CHLORIDE, CALCIUM CHLORIDE 600; 310; 30; 20 MG/100ML; MG/100ML; MG/100ML; MG/100ML
125 INJECTION, SOLUTION INTRAVENOUS CONTINUOUS
Status: DISCONTINUED | OUTPATIENT
Start: 2023-01-22 | End: 2023-01-23

## 2023-01-22 RX ORDER — FAMOTIDINE 10 MG/ML
20 INJECTION, SOLUTION INTRAVENOUS ONCE AS NEEDED
Status: DISCONTINUED | OUTPATIENT
Start: 2023-01-22 | End: 2023-01-24 | Stop reason: HOSPADM

## 2023-01-22 RX ORDER — BUTORPHANOL TARTRATE 1 MG/ML
2 INJECTION, SOLUTION INTRAMUSCULAR; INTRAVENOUS
Status: DISCONTINUED | OUTPATIENT
Start: 2023-01-22 | End: 2023-01-22 | Stop reason: HOSPADM

## 2023-01-22 RX ORDER — BUTORPHANOL TARTRATE 1 MG/ML
1 INJECTION, SOLUTION INTRAMUSCULAR; INTRAVENOUS
Status: DISCONTINUED | OUTPATIENT
Start: 2023-01-22 | End: 2023-01-22 | Stop reason: HOSPADM

## 2023-01-22 RX ORDER — SODIUM CHLORIDE 0.9 % (FLUSH) 0.9 %
1-10 SYRINGE (ML) INJECTION AS NEEDED
Status: DISCONTINUED | OUTPATIENT
Start: 2023-01-22 | End: 2023-01-22 | Stop reason: HOSPADM

## 2023-01-22 RX ORDER — TRISODIUM CITRATE DIHYDRATE AND CITRIC ACID MONOHYDRATE 500; 334 MG/5ML; MG/5ML
30 SOLUTION ORAL ONCE AS NEEDED
Status: DISCONTINUED | OUTPATIENT
Start: 2023-01-22 | End: 2023-01-22 | Stop reason: HOSPADM

## 2023-01-22 RX ORDER — IBUPROFEN 600 MG/1
600 TABLET ORAL EVERY 6 HOURS PRN
Status: DISCONTINUED | OUTPATIENT
Start: 2023-01-22 | End: 2023-01-22 | Stop reason: HOSPADM

## 2023-01-22 RX ORDER — LIDOCAINE HYDROCHLORIDE AND EPINEPHRINE 15; 5 MG/ML; UG/ML
INJECTION, SOLUTION EPIDURAL
Status: COMPLETED | OUTPATIENT
Start: 2023-01-22 | End: 2023-01-22

## 2023-01-22 RX ORDER — ACETAMINOPHEN 325 MG/1
650 TABLET ORAL EVERY 4 HOURS PRN
Status: DISCONTINUED | OUTPATIENT
Start: 2023-01-22 | End: 2023-01-22 | Stop reason: HOSPADM

## 2023-01-22 RX ORDER — ONDANSETRON 2 MG/ML
4 INJECTION INTRAMUSCULAR; INTRAVENOUS EVERY 6 HOURS PRN
Status: DISCONTINUED | OUTPATIENT
Start: 2023-01-22 | End: 2023-01-22 | Stop reason: HOSPADM

## 2023-01-22 RX ORDER — METHYLERGONOVINE MALEATE 0.2 MG/ML
200 INJECTION INTRAVENOUS ONCE AS NEEDED
Status: DISCONTINUED | OUTPATIENT
Start: 2023-01-22 | End: 2023-01-22 | Stop reason: HOSPADM

## 2023-01-22 RX ORDER — BUSPIRONE HYDROCHLORIDE 5 MG/1
5 TABLET ORAL 3 TIMES DAILY
Status: DISCONTINUED | OUTPATIENT
Start: 2023-01-22 | End: 2023-01-23

## 2023-01-22 RX ORDER — ONDANSETRON 4 MG/1
4 TABLET, FILM COATED ORAL EVERY 6 HOURS PRN
Status: DISCONTINUED | OUTPATIENT
Start: 2023-01-22 | End: 2023-01-22 | Stop reason: HOSPADM

## 2023-01-22 RX ORDER — OXYTOCIN/0.9 % SODIUM CHLORIDE 30/500 ML
2-30 PLASTIC BAG, INJECTION (ML) INTRAVENOUS
Status: DISCONTINUED | OUTPATIENT
Start: 2023-01-22 | End: 2023-01-22 | Stop reason: HOSPADM

## 2023-01-22 RX ORDER — CALCIUM CARBONATE 200(500)MG
2 TABLET,CHEWABLE ORAL 3 TIMES DAILY PRN
Status: DISCONTINUED | OUTPATIENT
Start: 2023-01-22 | End: 2023-01-22 | Stop reason: HOSPADM

## 2023-01-22 RX ORDER — LIDOCAINE HYDROCHLORIDE 10 MG/ML
5 INJECTION, SOLUTION EPIDURAL; INFILTRATION; INTRACAUDAL; PERINEURAL AS NEEDED
Status: DISCONTINUED | OUTPATIENT
Start: 2023-01-22 | End: 2023-01-22 | Stop reason: HOSPADM

## 2023-01-22 RX ORDER — SODIUM CHLORIDE 0.9 % (FLUSH) 0.9 %
10 SYRINGE (ML) INJECTION AS NEEDED
Status: DISCONTINUED | OUTPATIENT
Start: 2023-01-22 | End: 2023-01-23

## 2023-01-22 RX ORDER — SODIUM CHLORIDE 0.9 % (FLUSH) 0.9 %
10 SYRINGE (ML) INJECTION EVERY 8 HOURS
Status: DISCONTINUED | OUTPATIENT
Start: 2023-01-22 | End: 2023-01-23

## 2023-01-22 RX ORDER — OXYTOCIN/0.9 % SODIUM CHLORIDE 30/500 ML
250 PLASTIC BAG, INJECTION (ML) INTRAVENOUS CONTINUOUS
Status: DISPENSED | OUTPATIENT
Start: 2023-01-22 | End: 2023-01-22

## 2023-01-22 RX ORDER — MISOPROSTOL 200 UG/1
800 TABLET ORAL AS NEEDED
Status: DISCONTINUED | OUTPATIENT
Start: 2023-01-22 | End: 2023-01-22 | Stop reason: HOSPADM

## 2023-01-22 RX ORDER — CARBOPROST TROMETHAMINE 250 UG/ML
250 INJECTION, SOLUTION INTRAMUSCULAR AS NEEDED
Status: DISCONTINUED | OUTPATIENT
Start: 2023-01-22 | End: 2023-01-22 | Stop reason: HOSPADM

## 2023-01-22 RX ORDER — EPHEDRINE SULFATE 50 MG/ML
10 INJECTION, SOLUTION INTRAVENOUS
Status: DISCONTINUED | OUTPATIENT
Start: 2023-01-22 | End: 2023-01-22 | Stop reason: HOSPADM

## 2023-01-22 RX ORDER — OXYTOCIN/0.9 % SODIUM CHLORIDE 30/500 ML
999 PLASTIC BAG, INJECTION (ML) INTRAVENOUS ONCE
Status: DISCONTINUED | OUTPATIENT
Start: 2023-01-22 | End: 2023-01-22 | Stop reason: HOSPADM

## 2023-01-22 RX ORDER — SODIUM CHLORIDE, SODIUM LACTATE, POTASSIUM CHLORIDE, CALCIUM CHLORIDE 600; 310; 30; 20 MG/100ML; MG/100ML; MG/100ML; MG/100ML
125 INJECTION, SOLUTION INTRAVENOUS CONTINUOUS
Status: DISCONTINUED | OUTPATIENT
Start: 2023-01-22 | End: 2023-01-22 | Stop reason: SDUPTHER

## 2023-01-22 RX ADMIN — LIDOCAINE HYDROCHLORIDE AND EPINEPHRINE 1 ML: 15; 5 INJECTION, SOLUTION EPIDURAL at 16:20

## 2023-01-22 RX ADMIN — ROPIVACAINE HYDROCHLORIDE 1 ML/HR: 2 INJECTION, SOLUTION EPIDURAL; INFILTRATION at 16:31

## 2023-01-22 RX ADMIN — SODIUM CHLORIDE, POTASSIUM CHLORIDE, SODIUM LACTATE AND CALCIUM CHLORIDE 125 ML/HR: 600; 310; 30; 20 INJECTION, SOLUTION INTRAVENOUS at 11:35

## 2023-01-22 RX ADMIN — SODIUM CHLORIDE, POTASSIUM CHLORIDE, SODIUM LACTATE AND CALCIUM CHLORIDE 1000 ML: 600; 310; 30; 20 INJECTION, SOLUTION INTRAVENOUS at 15:53

## 2023-01-22 RX ADMIN — Medication 2 MILLI-UNITS/MIN: at 08:54

## 2023-01-22 RX ADMIN — IBUPROFEN 600 MG: 600 TABLET ORAL at 20:11

## 2023-01-22 RX ADMIN — SODIUM CHLORIDE, POTASSIUM CHLORIDE, SODIUM LACTATE AND CALCIUM CHLORIDE 125 ML/HR: 600; 310; 30; 20 INJECTION, SOLUTION INTRAVENOUS at 07:30

## 2023-01-22 RX ADMIN — Medication 250 ML/HR: at 19:59

## 2023-01-22 RX ADMIN — BUSPIRONE HYDROCHLORIDE 5 MG: 5 TABLET ORAL at 18:10

## 2023-01-22 RX ADMIN — ONDANSETRON 4 MG: 2 INJECTION INTRAMUSCULAR; INTRAVENOUS at 17:09

## 2023-01-22 RX ADMIN — BUSPIRONE HYDROCHLORIDE 5 MG: 5 TABLET ORAL at 10:46

## 2023-01-22 NOTE — ANESTHESIA PREPROCEDURE EVALUATION
Anesthesia Evaluation     Patient summary reviewed and Nursing notes reviewed   NPO Solid Status: N/A  NPO Liquid Status: N/A           Airway   Mallampati: II  TM distance: >3 FB  Neck ROM: full  No difficulty expected  Dental - normal exam     Pulmonary - negative pulmonary ROS   Cardiovascular - negative cardio ROS        Neuro/Psych- negative ROS  GI/Hepatic/Renal/Endo - negative ROS     Musculoskeletal (-) negative ROS    Abdominal    Substance History - negative use     OB/GYN    (+) Pregnant,         Other                          Anesthesia Plan    ASA 2     epidural       Anesthetic plan, risks, benefits, and alternatives have been provided, discussed and informed consent has been obtained with: patient and spouse/significant other.

## 2023-01-22 NOTE — ANESTHESIA PROCEDURE NOTES
Labor Epidural    Pre-sedation assessment completed: 1/22/2023 4:20 PM    Patient location during procedure: OB  Start Time: 1/22/2023 4:20 PM  Stop Time: 1/22/2023 4:30 PM  Indication:at surgeon's request  Performed By  CRNA/CAA: Ward Fontaine CRNA  Preanesthetic Checklist  Completed: patient identified, IV checked, site marked, risks and benefits discussed, surgical consent, monitors and equipment checked, pre-op evaluation and timeout performed  Additional Notes  Loss Of Resistance Technique utilized.  Negative heme or paresthesias.  Positive csf through catheter  Prep:  Pt Position:sitting  Sterile Tech:mask, sterile barrier and gloves  Prep:chlorhexidine gluconate and isopropyl alcohol  Monitoring:blood pressure monitoring, continuous pulse oximetry and EKG  Epidural Block Procedure:  Approach:midline  Guidance:palpation technique  Location:L3-L4  Needle Type:Tuohy  Needle Gauge:18 G  Loss of Resistance Medium: air  Loss of Resistance: 7cm  Cath Depth at skin:12 cm  Paresthesia: none  Aspiration:positive  Test Dose:positive  Medication: lidocaine 1.5%-EPINEPHrine 1:200,000 (XYLOCAINE W/EPI) injection - Injection   1 mL - 1/22/2023 4:20:00 PM  Number of Attempts: 1  Post Assessment:  Dressing:biopatch applied, occlusive dressing applied and secured with tape  Pt Tolerance:patient tolerated the procedure well with no apparent complications  Complications:no

## 2023-01-23 LAB
HCT VFR BLD AUTO: 34.7 % (ref 34–46.6)
HGB BLD-MCNC: 11.6 G/DL (ref 12–15.9)

## 2023-01-23 PROCEDURE — 0503F POSTPARTUM CARE VISIT: CPT | Performed by: OBSTETRICS & GYNECOLOGY

## 2023-01-23 PROCEDURE — 85014 HEMATOCRIT: CPT | Performed by: OBSTETRICS & GYNECOLOGY

## 2023-01-23 PROCEDURE — 85018 HEMOGLOBIN: CPT | Performed by: OBSTETRICS & GYNECOLOGY

## 2023-01-23 RX ORDER — METHYLERGONOVINE MALEATE 0.2 MG/ML
200 INJECTION INTRAVENOUS ONCE AS NEEDED
Status: DISCONTINUED | OUTPATIENT
Start: 2023-01-23 | End: 2023-01-24 | Stop reason: HOSPADM

## 2023-01-23 RX ORDER — DOCUSATE SODIUM 100 MG/1
100 CAPSULE, LIQUID FILLED ORAL 2 TIMES DAILY
Status: DISCONTINUED | OUTPATIENT
Start: 2023-01-23 | End: 2023-01-24 | Stop reason: HOSPADM

## 2023-01-23 RX ORDER — MORPHINE SULFATE 2 MG/ML
1 INJECTION, SOLUTION INTRAMUSCULAR; INTRAVENOUS EVERY 4 HOURS PRN
Status: DISCONTINUED | OUTPATIENT
Start: 2023-01-23 | End: 2023-01-24 | Stop reason: HOSPADM

## 2023-01-23 RX ORDER — PROMETHAZINE HYDROCHLORIDE 12.5 MG/1
12.5 SUPPOSITORY RECTAL EVERY 6 HOURS PRN
Status: DISCONTINUED | OUTPATIENT
Start: 2023-01-23 | End: 2023-01-24 | Stop reason: HOSPADM

## 2023-01-23 RX ORDER — SODIUM CHLORIDE 0.9 % (FLUSH) 0.9 %
1-10 SYRINGE (ML) INJECTION AS NEEDED
Status: DISCONTINUED | OUTPATIENT
Start: 2023-01-23 | End: 2023-01-24 | Stop reason: HOSPADM

## 2023-01-23 RX ORDER — IBUPROFEN 600 MG/1
600 TABLET ORAL EVERY 8 HOURS PRN
Status: DISCONTINUED | OUTPATIENT
Start: 2023-01-23 | End: 2023-01-24 | Stop reason: HOSPADM

## 2023-01-23 RX ORDER — HYDROXYZINE HYDROCHLORIDE 25 MG/1
50 TABLET, FILM COATED ORAL NIGHTLY PRN
Status: DISCONTINUED | OUTPATIENT
Start: 2023-01-23 | End: 2023-01-24 | Stop reason: HOSPADM

## 2023-01-23 RX ORDER — HYDROCORTISONE 25 MG/G
1 CREAM TOPICAL AS NEEDED
Status: DISCONTINUED | OUTPATIENT
Start: 2023-01-23 | End: 2023-01-24 | Stop reason: HOSPADM

## 2023-01-23 RX ORDER — CALCIUM CARBONATE 200(500)MG
2 TABLET,CHEWABLE ORAL 3 TIMES DAILY PRN
Status: DISCONTINUED | OUTPATIENT
Start: 2023-01-23 | End: 2023-01-24 | Stop reason: HOSPADM

## 2023-01-23 RX ORDER — BISACODYL 10 MG
10 SUPPOSITORY, RECTAL RECTAL DAILY PRN
Status: DISCONTINUED | OUTPATIENT
Start: 2023-01-23 | End: 2023-01-24 | Stop reason: HOSPADM

## 2023-01-23 RX ORDER — MISOPROSTOL 200 UG/1
600 TABLET ORAL ONCE AS NEEDED
Status: DISCONTINUED | OUTPATIENT
Start: 2023-01-23 | End: 2023-01-24 | Stop reason: HOSPADM

## 2023-01-23 RX ORDER — PRENATAL VIT/IRON FUM/FOLIC AC 27MG-0.8MG
1 TABLET ORAL DAILY
Status: DISCONTINUED | OUTPATIENT
Start: 2023-01-23 | End: 2023-01-24 | Stop reason: HOSPADM

## 2023-01-23 RX ORDER — ONDANSETRON 2 MG/ML
4 INJECTION INTRAMUSCULAR; INTRAVENOUS EVERY 6 HOURS PRN
Status: DISCONTINUED | OUTPATIENT
Start: 2023-01-23 | End: 2023-01-24 | Stop reason: HOSPADM

## 2023-01-23 RX ORDER — NALOXONE HCL 0.4 MG/ML
0.4 VIAL (ML) INJECTION
Status: DISCONTINUED | OUTPATIENT
Start: 2023-01-23 | End: 2023-01-24 | Stop reason: HOSPADM

## 2023-01-23 RX ORDER — HYDROCODONE BITARTRATE AND ACETAMINOPHEN 5; 325 MG/1; MG/1
1 TABLET ORAL EVERY 4 HOURS PRN
Status: DISCONTINUED | OUTPATIENT
Start: 2023-01-23 | End: 2023-01-24 | Stop reason: HOSPADM

## 2023-01-23 RX ORDER — PROMETHAZINE HYDROCHLORIDE 25 MG/1
25 TABLET ORAL EVERY 6 HOURS PRN
Status: DISCONTINUED | OUTPATIENT
Start: 2023-01-23 | End: 2023-01-24 | Stop reason: HOSPADM

## 2023-01-23 RX ORDER — ONDANSETRON 4 MG/1
4 TABLET, FILM COATED ORAL EVERY 8 HOURS PRN
Status: DISCONTINUED | OUTPATIENT
Start: 2023-01-23 | End: 2023-01-24 | Stop reason: HOSPADM

## 2023-01-23 RX ADMIN — PRENATAL VIT W/ FE FUMARATE-FA TAB 27-0.8 MG 1 TABLET: 27-0.8 TAB at 08:21

## 2023-01-23 RX ADMIN — DOCUSATE SODIUM 100 MG: 100 CAPSULE, LIQUID FILLED ORAL at 10:15

## 2023-01-23 RX ADMIN — IBUPROFEN 600 MG: 600 TABLET ORAL at 14:18

## 2023-01-23 RX ADMIN — IBUPROFEN 600 MG: 600 TABLET ORAL at 06:26

## 2023-01-23 RX ADMIN — Medication: at 01:16

## 2023-01-23 RX ADMIN — DOCUSATE SODIUM 100 MG: 100 CAPSULE, LIQUID FILLED ORAL at 21:48

## 2023-01-23 NOTE — ANESTHESIA POSTPROCEDURE EVALUATION
"Patient: Duran Baires    Procedure Summary     Date: 01/22/23 Room / Location:     Anesthesia Start: 1620 Anesthesia Stop: 1855    Procedure: LABOR ANALGESIA Diagnosis:     Scheduled Providers:  Provider: Ward Fontaine CRNA    Anesthesia Type: epidural ASA Status: 2          Anesthesia Type: epidural    Vitals  Vitals Value Taken Time   /87 01/23/23 1317   Temp 97 °F (36.1 °C) 01/23/23 1317   Pulse 81 01/23/23 1317   Resp 16 01/23/23 1317   SpO2 99 % 01/23/23 1317           Post Anesthesia Care and Evaluation    Patient location during evaluation: floor  Patient participation: complete - patient participated  Level of consciousness: awake and alert  Pain score: 0  Pain management: adequate    Airway patency: patent  Anesthetic complications: No anesthetic complications  PONV Status: none  Cardiovascular status: acceptable  Respiratory status: acceptable  Hydration status: acceptable  Post Neuraxial Block status: Motor and sensory function returned to baseline and No signs or symptoms of PDPH  Comments: When I arrived to the room the lights were on and the patient was ambulating. She stated that she felt liquid around the catheter this morning after moving around. When I went to remove the catheter, there was only 2 cm within patients lumbar region and the blue tip was intact. Blood pressure 115/87, pulse 81, temperature 97 °F (36.1 °C), temperature source Temporal, resp. rate 16, height 165.1 cm (65\"), weight 103 kg (228 lb), SpO2 99 %, currently breastfeeding.          "

## 2023-01-24 VITALS
BODY MASS INDEX: 37.99 KG/M2 | HEIGHT: 65 IN | DIASTOLIC BLOOD PRESSURE: 69 MMHG | SYSTOLIC BLOOD PRESSURE: 109 MMHG | RESPIRATION RATE: 18 BRPM | WEIGHT: 228 LBS | TEMPERATURE: 97.4 F | HEART RATE: 75 BPM | OXYGEN SATURATION: 98 %

## 2023-01-24 RX ORDER — ACETAMINOPHEN 325 MG/1
650 TABLET ORAL EVERY 4 HOURS PRN
Qty: 100 TABLET | Refills: 2
Start: 2023-01-24 | End: 2023-03-06

## 2023-01-24 RX ORDER — IBUPROFEN 200 MG
TABLET ORAL
Start: 2023-01-24 | End: 2023-03-06

## 2023-01-24 RX ADMIN — PRENATAL VIT W/ FE FUMARATE-FA TAB 27-0.8 MG 1 TABLET: 27-0.8 TAB at 08:28

## 2023-01-24 RX ADMIN — IBUPROFEN 600 MG: 600 TABLET ORAL at 06:05

## 2023-01-24 RX ADMIN — DOCUSATE SODIUM 100 MG: 100 CAPSULE, LIQUID FILLED ORAL at 09:32

## 2023-03-06 ENCOUNTER — POSTPARTUM VISIT (OUTPATIENT)
Dept: OBSTETRICS AND GYNECOLOGY | Facility: CLINIC | Age: 28
End: 2023-03-06
Payer: COMMERCIAL

## 2023-03-06 VITALS
SYSTOLIC BLOOD PRESSURE: 132 MMHG | DIASTOLIC BLOOD PRESSURE: 82 MMHG | BODY MASS INDEX: 34.49 KG/M2 | HEIGHT: 65 IN | WEIGHT: 207 LBS

## 2023-03-06 PROBLEM — Z34.90 PREGNANCY: Status: RESOLVED | Noted: 2023-01-16 | Resolved: 2023-03-06

## 2023-03-06 PROBLEM — O36.8190 DECREASED FETAL MOVEMENT: Status: RESOLVED | Noted: 2023-01-22 | Resolved: 2023-03-06

## 2023-03-06 PROBLEM — O09.299: Status: RESOLVED | Noted: 2022-07-10 | Resolved: 2023-03-06

## 2023-03-06 PROCEDURE — 0503F POSTPARTUM CARE VISIT: CPT | Performed by: OBSTETRICS & GYNECOLOGY

## 2023-03-06 NOTE — PROGRESS NOTES
"Duran Baires is here for a postpartum visit after a vaginal delivery 6 weeks ago.  The depression questionnaire has been completed.  She has no signs of postpartum depression today.  She has not had a period since her delivery and is breast-feeding her infant without difficulty.  Her last Pap smear was 5/2020 and normal.  She has no history of cervical dysplasia.  She plans on using condoms for contraception.    /82 (BP Location: Left arm, Patient Position: Sitting)   Ht 165.1 cm (65\")   Wt 93.9 kg (207 lb)   LMP 05/04/2022   BMI 34.45 kg/m²    In general pleasant female no acute distress  Neck no thyromegaly  Abdomen soft and nontender  A Pap smear was not performed.     Assessment: Normal postpartum exam.    We have discussed current Pap smear screening guidelines. She will contact he office if she desires hormonal contraception. She will return in about 3 months for Pap smear. Call with questions or concerns.  "

## 2024-10-01 ENCOUNTER — OFFICE VISIT (OUTPATIENT)
Age: 29
End: 2024-10-01
Payer: COMMERCIAL

## 2024-10-01 VITALS
BODY MASS INDEX: 31.82 KG/M2 | WEIGHT: 191 LBS | DIASTOLIC BLOOD PRESSURE: 82 MMHG | HEIGHT: 65 IN | SYSTOLIC BLOOD PRESSURE: 112 MMHG

## 2024-10-01 DIAGNOSIS — N91.2 AMENORRHEA: Primary | ICD-10-CM

## 2024-10-01 DIAGNOSIS — Z32.01 POSITIVE PREGNANCY TEST: ICD-10-CM

## 2024-10-01 RX ORDER — AMOXICILLIN 500 MG
1200 CAPSULE ORAL DAILY
COMMUNITY

## 2024-10-01 NOTE — PROGRESS NOTES
"Duran Baires is a 29 y.o. female here today for evaluation of vaginal bleeding in early pregnancy.  4 days ago she had moderate vaginal bleeding with the passage of a few clots.  Today she is without vaginal bleeding but does have some mild cramping.  She has no significant nausea but does have some constipation.    Visit Vitals  /82 (BP Location: Left arm, Patient Position: Sitting)   Ht 165.1 cm (65\")   Wt 86.6 kg (191 lb)   BMI 31.78 kg/m²     Pleasant female no acute distress  Mood and affect normal  Breathing unlabored    A pelvic ultrasound was ordered and performed in the office today.  There is a fetal pole measuring 6 weeks and 4 days with fetal heart rate 91.  There is a 4 cm long subchorionic fluid collection identified.    Blood type O positive    Assessment: Threatened     She is not currently bleeding, but the fetal heart rate is a little slower than I would expect for this gestational age.  She will return to the office in 1 week to recheck her symptoms and to perform a pelvic ultrasound for viability.  Bleeding precautions discussed.  Call for questions or concerns.      "

## 2024-10-07 ENCOUNTER — INITIAL PRENATAL (OUTPATIENT)
Age: 29
End: 2024-10-07
Payer: COMMERCIAL

## 2024-10-07 VITALS — WEIGHT: 190 LBS | SYSTOLIC BLOOD PRESSURE: 112 MMHG | DIASTOLIC BLOOD PRESSURE: 84 MMHG | BODY MASS INDEX: 31.62 KG/M2

## 2024-10-07 DIAGNOSIS — O09.291 PRIOR PREGNANCY WITH FETAL DEMISE AND CURRENT PREGNANCY IN FIRST TRIMESTER: Primary | ICD-10-CM

## 2024-10-07 PROCEDURE — 0501F PRENATAL FLOW SHEET: CPT | Performed by: OBSTETRICS & GYNECOLOGY

## 2024-10-07 NOTE — PROGRESS NOTES
New OB, US ordered today, reviewed and shows 7 weeks gestation, EDC 5/23/25  Prior term IUFD, then uncomplicated pregnancy and vaginal delivery induced at 37 weeks  No further VB  Having nausea, fatigue, but no headaches  New OB labs ordered today  Discussed OTC Unisom and B6  Discussed Tdap, and flu vaccine recommendations  Plans flu vaccine at work  Discussed ffDNA screening option  Discussed normal prenatal routines  Questions answered    Diagnoses and all orders for this visit:    1. Prior pregnancy with fetal demise and current pregnancy in first trimester (Primary)  -     Chlamydia trachomatis, Neisseria gonorrhoeae, PCR w/ confirmation - Urine, Urine, Clean Catch  -     ABO / Rh  -     Ambulatory Referral to M/Perinatology  -     Antibody Screen  -     CBC & Differential  -     Hepatitis B Surface Antigen  -     Urine Culture - Urine, Urine, Clean Catch  -     ToxASSURE Select 13 (MW) - Urine, Clean Catch  -     Rubella Antibody, IgG  -     RPR Qualitative with Reflex to Quant  -     HIV-1 / O / 2 Ag / Antibody  -     Hepatitis C Antibody

## 2024-10-15 LAB
ABO GROUP BLD: NORMAL
BACTERIA UR CULT: NORMAL
BACTERIA UR CULT: NORMAL
BASOPHILS # BLD AUTO: 0 X10E3/UL (ref 0–0.2)
BASOPHILS NFR BLD AUTO: 0 %
BLD GP AB SCN SERPL QL: NEGATIVE
C TRACH RRNA SPEC QL NAA+PROBE: NEGATIVE
DRUGS UR: NORMAL
EOSINOPHIL # BLD AUTO: 0.1 X10E3/UL (ref 0–0.4)
EOSINOPHIL NFR BLD AUTO: 1 %
ERYTHROCYTE [DISTWIDTH] IN BLOOD BY AUTOMATED COUNT: 12.4 % (ref 11.7–15.4)
HBV SURFACE AG SERPL QL IA: NEGATIVE
HCT VFR BLD AUTO: 38.7 % (ref 34–46.6)
HCV IGG SERPL QL IA: NON REACTIVE
HGB BLD-MCNC: 12.8 G/DL (ref 11.1–15.9)
HIV 1+2 AB+HIV1 P24 AG SERPL QL IA: NON REACTIVE
IMM GRANULOCYTES # BLD AUTO: 0 X10E3/UL (ref 0–0.1)
IMM GRANULOCYTES NFR BLD AUTO: 0 %
LYMPHOCYTES # BLD AUTO: 2.7 X10E3/UL (ref 0.7–3.1)
LYMPHOCYTES NFR BLD AUTO: 35 %
MCH RBC QN AUTO: 30.7 PG (ref 26.6–33)
MCHC RBC AUTO-ENTMCNC: 33.1 G/DL (ref 31.5–35.7)
MCV RBC AUTO: 93 FL (ref 79–97)
MONOCYTES # BLD AUTO: 0.5 X10E3/UL (ref 0.1–0.9)
MONOCYTES NFR BLD AUTO: 6 %
N GONORRHOEA RRNA SPEC QL NAA+PROBE: NEGATIVE
NEUTROPHILS # BLD AUTO: 4.4 X10E3/UL (ref 1.4–7)
NEUTROPHILS NFR BLD AUTO: 58 %
PLATELET # BLD AUTO: 247 X10E3/UL (ref 150–450)
RBC # BLD AUTO: 4.17 X10E6/UL (ref 3.77–5.28)
RH BLD: POSITIVE
RPR SER QL: NON REACTIVE
RUBV IGG SERPL IA-ACNC: 17.6 INDEX
WBC # BLD AUTO: 7.7 X10E3/UL (ref 3.4–10.8)

## 2024-11-04 ENCOUNTER — TELEPHONE (OUTPATIENT)
Dept: OBSTETRICS AND GYNECOLOGY | Age: 29
End: 2024-11-04
Payer: COMMERCIAL

## 2024-11-04 NOTE — TELEPHONE ENCOUNTER
Discussed bleeding and pain with pt.  Denies worsening from the weekend.  Pt has spoken with Ema Blank and advised pt to keep appt. Thursday with Dr Watkins.  Pt has US and OV scheduled.  Pt advised to call with worsening bleeding or pain.  Pt voiced understanding.

## 2024-11-07 ENCOUNTER — OFFICE VISIT (OUTPATIENT)
Age: 29
End: 2024-11-07
Payer: COMMERCIAL

## 2024-11-07 VITALS
SYSTOLIC BLOOD PRESSURE: 122 MMHG | DIASTOLIC BLOOD PRESSURE: 82 MMHG | HEIGHT: 65 IN | WEIGHT: 194 LBS | BODY MASS INDEX: 32.32 KG/M2

## 2024-11-07 DIAGNOSIS — O03.9 SPONTANEOUS ABORTION: Primary | ICD-10-CM

## 2024-11-07 NOTE — PROGRESS NOTES
"Duran Baires is a 29 y.o. female here today for follow-up of a spontaneous .  Last weekend, on Saturday she had heavy vaginal bleeding and significant cramping.  She reports seeing the passage of tissue.  She denies pain today but is still having some light bleeding.    Blood type O+    Visit Vitals  /82 (BP Location: Left arm, Patient Position: Sitting)   Ht 165.1 cm (65\")   Wt 88 kg (194 lb)   Breastfeeding Unknown   BMI 32.28 kg/m²     Pleasant female no acute distress  Mood and affect normal  Breathing unlabored    A pelvic ultrasound was ordered and performed in the office today.  The uterus appears empty and the endometrium measures 9 mm.  Appears to be bicornuate uterus consistent with prior imaging.    Assessment: Spontaneous     She would like to try to conceive again, and declines contraception.  She will continue a prenatal vitamin and add a 1 mg folic acid supplement.  Follow-up in 4 weeks around the time of next expected menstrual cycle.  We discussed that there is no waiting period prior to trying to conceive again.  We discussed no increased risk of miscarriage because of 1 prior miscarriage.  Call with questions or concerns.      "

## 2024-12-05 ENCOUNTER — OFFICE VISIT (OUTPATIENT)
Age: 29
End: 2024-12-05
Payer: COMMERCIAL

## 2024-12-05 VITALS
SYSTOLIC BLOOD PRESSURE: 122 MMHG | WEIGHT: 193 LBS | HEIGHT: 65 IN | BODY MASS INDEX: 32.15 KG/M2 | DIASTOLIC BLOOD PRESSURE: 92 MMHG

## 2024-12-05 DIAGNOSIS — Z32.01 POSITIVE PREGNANCY TEST: Primary | ICD-10-CM

## 2024-12-05 LAB
B-HCG UR QL: NEGATIVE
EXPIRATION DATE: NORMAL
INTERNAL NEGATIVE CONTROL: NEGATIVE
INTERNAL POSITIVE CONTROL: POSITIVE
Lab: NORMAL

## 2024-12-05 NOTE — PROGRESS NOTES
"Duran Baires is a 29 y.o. female here today for evaluation of a positive pregnancy test.  She has not had a menstrual cycle since her miscarriage, but reports a positive home urine pregnancy test almost a week ago.  She has not had any vaginal bleeding and denies pain.    Visit Vitals  /92 (BP Location: Left arm, Patient Position: Sitting)   Ht 165.1 cm (65\")   Wt 87.5 kg (193 lb)   Breastfeeding No   BMI 32.12 kg/m²     Pleasant female no acute distress  Mood and affect normal  Breathing unlabored    Urine pregnancy test in the office today is negative    Assessment: Positive pregnancy test unconfirmed    It is possible that her positive pregnancy test at home was a declining hCG level from her miscarriage.  I have ordered a quantitative hCG level today and we will follow-up pending results.  Pain and bleeding precautions discussed.  Call with questions or concerns.      "

## 2024-12-06 LAB — HCG INTACT+B SERPL-ACNC: 2 MIU/ML

## 2025-02-04 ENCOUNTER — INITIAL PRENATAL (OUTPATIENT)
Age: 30
End: 2025-02-04
Payer: COMMERCIAL

## 2025-02-04 VITALS — BODY MASS INDEX: 32.62 KG/M2 | SYSTOLIC BLOOD PRESSURE: 106 MMHG | WEIGHT: 196 LBS | DIASTOLIC BLOOD PRESSURE: 78 MMHG

## 2025-02-04 DIAGNOSIS — O09.291 PRIOR PREGNANCY WITH FETAL DEMISE AND CURRENT PREGNANCY IN FIRST TRIMESTER: Primary | ICD-10-CM

## 2025-02-04 RX ORDER — FOLIC ACID 0.4 MG
400 TABLET ORAL DAILY
COMMUNITY

## 2025-02-04 NOTE — PROGRESS NOTES
New OB, US ordered today, reviewed and shows 6 weeks gestation, EDC 9/27/25  Second pregnancy uncomplicated and vaginal delivery, after first pregnancy with term IUFD  Having mild nausea, fatigue, but no headaches  New OB labs ordered today  Discussed OTC Unisom and B6  Discussed Tdap, and flu vaccine recommendations  Discussed ffDNA screening option  Discussed normal prenatal routines  Questions answered    Diagnoses and all orders for this visit:    1. Prior pregnancy with fetal demise and current pregnancy in first trimester (Primary)  -     Chlamydia trachomatis, Neisseria gonorrhoeae, PCR w/ confirmation - Urine, Urine, Clean Catch  -     ABO / Rh  -     Ambulatory Referral to MFM/Perinatology  -     Antibody Screen  -     CBC & Differential  -     Hepatitis B Surface Antigen  -     Hepatitis C Antibody  -     HIV-1 / O / 2 Ag / Antibody  -     RPR Qualitative with Reflex to Quant  -     Rubella Antibody, IgG  -     ToxASSURE Select 13 (MW) - Urine, Clean Catch  -     Urine Culture - Urine, Urine, Clean Catch

## 2025-02-10 ENCOUNTER — ROUTINE PRENATAL (OUTPATIENT)
Age: 30
End: 2025-02-10
Payer: COMMERCIAL

## 2025-02-10 VITALS — BODY MASS INDEX: 32.12 KG/M2 | SYSTOLIC BLOOD PRESSURE: 118 MMHG | WEIGHT: 193 LBS | DIASTOLIC BLOOD PRESSURE: 74 MMHG

## 2025-02-10 DIAGNOSIS — Z34.81 MULTIGRAVIDA IN FIRST TRIMESTER: ICD-10-CM

## 2025-02-10 DIAGNOSIS — O20.0 THREATENED ABORTION: ICD-10-CM

## 2025-02-10 DIAGNOSIS — O41.8X10 SUBCHORIONIC HEMATOMA IN FIRST TRIMESTER, SINGLE OR UNSPECIFIED FETUS: ICD-10-CM

## 2025-02-10 DIAGNOSIS — O09.291 PRIOR PREGNANCY WITH FETAL DEMISE AND CURRENT PREGNANCY IN FIRST TRIMESTER: ICD-10-CM

## 2025-02-10 DIAGNOSIS — Z3A.01 7 WEEKS GESTATION OF PREGNANCY: Primary | ICD-10-CM

## 2025-02-10 DIAGNOSIS — O46.8X1 SUBCHORIONIC HEMATOMA IN FIRST TRIMESTER, SINGLE OR UNSPECIFIED FETUS: ICD-10-CM

## 2025-02-10 PROBLEM — O09.299: Status: ACTIVE | Noted: 2022-09-26

## 2025-02-10 LAB
ABO GROUP BLD: NORMAL
BACTERIA UR CULT: NORMAL
BACTERIA UR CULT: NORMAL
BASOPHILS # BLD AUTO: 0 X10E3/UL (ref 0–0.2)
BASOPHILS NFR BLD AUTO: 1 %
BLD GP AB SCN SERPL QL: NEGATIVE
C TRACH RRNA SPEC QL NAA+PROBE: NEGATIVE
DRUGS UR: NORMAL
EOSINOPHIL # BLD AUTO: 0.1 X10E3/UL (ref 0–0.4)
EOSINOPHIL NFR BLD AUTO: 1 %
ERYTHROCYTE [DISTWIDTH] IN BLOOD BY AUTOMATED COUNT: 12 % (ref 11.7–15.4)
HBV SURFACE AG SERPL QL IA: NEGATIVE
HCT VFR BLD AUTO: 40.1 % (ref 34–46.6)
HCV IGG SERPL QL IA: NON REACTIVE
HGB BLD-MCNC: 13.2 G/DL (ref 11.1–15.9)
HIV 1+2 AB+HIV1 P24 AG SERPL QL IA: NON REACTIVE
IMM GRANULOCYTES # BLD AUTO: 0 X10E3/UL (ref 0–0.1)
IMM GRANULOCYTES NFR BLD AUTO: 0 %
LYMPHOCYTES # BLD AUTO: 2.3 X10E3/UL (ref 0.7–3.1)
LYMPHOCYTES NFR BLD AUTO: 39 %
MCH RBC QN AUTO: 30.5 PG (ref 26.6–33)
MCHC RBC AUTO-ENTMCNC: 32.9 G/DL (ref 31.5–35.7)
MCV RBC AUTO: 93 FL (ref 79–97)
MONOCYTES # BLD AUTO: 0.4 X10E3/UL (ref 0.1–0.9)
MONOCYTES NFR BLD AUTO: 6 %
N GONORRHOEA RRNA SPEC QL NAA+PROBE: NEGATIVE
NEUTROPHILS # BLD AUTO: 3.2 X10E3/UL (ref 1.4–7)
NEUTROPHILS NFR BLD AUTO: 53 %
PLATELET # BLD AUTO: 259 X10E3/UL (ref 150–450)
RBC # BLD AUTO: 4.33 X10E6/UL (ref 3.77–5.28)
RH BLD: POSITIVE
RPR SER QL: NON REACTIVE
RUBV IGG SERPL IA-ACNC: 16.8 INDEX
WBC # BLD AUTO: 6 X10E3/UL (ref 3.4–10.8)

## 2025-02-10 NOTE — PROGRESS NOTES
"Reason for visit: Routine OB visit at 7w2d. LAM 2025, by Ultrasound    CC:  She had 11 hours of heavy bleeding yesterday with clots but none today. She was cramping as well. Today she is no longer having any symptoms. Denies VB, LOF, pelvic pain, or cramping.     ROS: All systems reviewed and are negative with exception of the following: amenorrhea    Wt 193 lb for a TWG of -1.361 kg (-3 lb), /74, FHTs 147  Urine today and reviewed: unable to void    US today: FHT's noted at 147, JORGE 5.1 cm; \"IUP with appropriate growth, bicornuate uterus with IUP in right horn\"    Exam:  General Appearance:  Healthy appearing . Normal mood and behavior.  HEENT: NCAT, EOMI  HR str and reg. Lungs clear. Resp even and unlabored.  Abdomen is soft and nontender. No CVA tenderness. Uterus is consistent with EGA  Ext: no edema, nontender, no trauma, or cyanosis.    Impression  Diagnoses and all orders for this visit:    1. 7 weeks gestation of pregnancy (Primary)  Discussed first trimester of pregnancy, including discomforts and measures of comfort, pelvic pain/cramping warnings, bleeding warnings, and signs and symptoms to report. Discussed and encouraged to come to the hospital or call for vaginal bleeding, suspected leaking of fluid, pelvic pain/cramping, or any other concerns. First trimester of pregnancy video included in the AVS.    2. Threatened   FHT's noted. No sign that this is a miscarriage as of now.    3. Multigravida in first trimester    4. Subchorionic hematoma in first trimester, single or unspecified fetus  Discussed JORGE and information included in the AVS    5. Prior pregnancy with fetal demise and current pregnancy in first trimester            Follow up in 4 weeks for routine visit with Dr. Watkins.    This note has been signed electronically.  KIRSTY Clay, HANK    "

## 2025-03-04 ENCOUNTER — OFFICE VISIT (OUTPATIENT)
Age: 30
End: 2025-03-04
Payer: COMMERCIAL

## 2025-03-04 VITALS
BODY MASS INDEX: 32.65 KG/M2 | SYSTOLIC BLOOD PRESSURE: 112 MMHG | DIASTOLIC BLOOD PRESSURE: 84 MMHG | WEIGHT: 196 LBS | HEIGHT: 65 IN

## 2025-03-04 DIAGNOSIS — O03.9 SPONTANEOUS ABORTION: Primary | ICD-10-CM

## 2025-03-04 NOTE — PROGRESS NOTES
"Duran Baires is a 30 y.o. female here today for follow-up of a spontaneous .  She was seen on February 10 for vaginal bleeding and had a 5 cm subchorionic hematoma identified on imaging.  On  she woke up with dark vaginal bleeding, but later in the morning it progressed to bright red bleeding.  She had the passage of a few clots and a visible gestational sac.  She reports light bleeding today.  She also had a mid first trimester spontaneous  at 7 weeks gestational age 2024.  Prior imaging has suggested bicornuate uterus.    Visit Vitals  /84 (BP Location: Left arm, Patient Position: Sitting)   Ht 165.1 cm (65\")   Wt 88.9 kg (196 lb)   Breastfeeding No   BMI 32.62 kg/m²     Pleasant female no acute distress  Mood and affect normal  Breathing unlabored    Pelvic ultrasound was ordered and performed in the office today.  The uterus does appear to have either a septum or bicornuate configuration.  The previously visualized gestational sac and fetal pole are no longer present.  The endometrium is fairly homogenous and measures 12 mm.    Assessment: Spontaneous     She brought the tissue with her today and would like to send it for genetic testing. ANORA testing ordered.  We will contact her with those results return.  We discussed the option of a pelvic MRI to further evaluate the shape of her uterus, and she would like to consider this option perhaps at a later date.  She declines in person follow-up in a few weeks, but we can discuss imaging further once we have the genetic testing results back.  Call with questions or concerns in the meantime.          "

## 2025-03-12 LAB
Lab: NORMAL
NTRA AMT RESULT 1: NORMAL

## 2025-05-27 ENCOUNTER — TELEPHONE (OUTPATIENT)
Dept: OBSTETRICS AND GYNECOLOGY | Age: 30
End: 2025-05-27

## 2025-05-27 NOTE — TELEPHONE ENCOUNTER
Caller: Duran Baires  Female, 30 y.o., 1995  MRN: [id number]  CSN: [id number]  Phone: [phone]    Relationship to patient: SELF      Type of visit: NEW OB APPT  LMP 4-15-25    Requested date: PT REQ APPT TO BE ON 6-12-25 IF ABLE    HUB NEXT LOLITA NOT UNTIL 6-23-25       Additional notes:PT REQ A CALL BACK TO [facility] APPT

## 2025-06-13 ENCOUNTER — INITIAL PRENATAL (OUTPATIENT)
Dept: OBSTETRICS AND GYNECOLOGY | Age: 30
End: 2025-06-13
Payer: COMMERCIAL

## 2025-06-13 VITALS — WEIGHT: 193 LBS | BODY MASS INDEX: 32.12 KG/M2 | DIASTOLIC BLOOD PRESSURE: 82 MMHG | SYSTOLIC BLOOD PRESSURE: 124 MMHG

## 2025-06-13 DIAGNOSIS — Z3A.01 7 WEEKS GESTATION OF PREGNANCY: Primary | ICD-10-CM

## 2025-06-13 DIAGNOSIS — Q51.3 BICORNUATE UTERUS AFFECTING PREGNANCY IN FIRST TRIMESTER, ANTEPARTUM: ICD-10-CM

## 2025-06-13 DIAGNOSIS — Z87.59 HISTORY OF IUFD: ICD-10-CM

## 2025-06-13 DIAGNOSIS — O34.01 BICORNUATE UTERUS AFFECTING PREGNANCY IN FIRST TRIMESTER, ANTEPARTUM: ICD-10-CM

## 2025-06-13 DIAGNOSIS — Z34.81 MULTIGRAVIDA IN FIRST TRIMESTER: ICD-10-CM

## 2025-06-13 DIAGNOSIS — O26.21 RECURRENT PREGNANCY LOSS IN PREGNANT PATIENT IN FIRST TRIMESTER, ANTEPARTUM: ICD-10-CM

## 2025-06-13 NOTE — PROGRESS NOTES
[facility]   HISTORY AND PHYSICAL  Subjective   Subjective     Chief Complaint:   Chief Complaint   Patient presents with    Initial Prenatal Visit     Est to office, new to Dr Armendariz. Last pap 2020 normal. Hx of IUFD at 39 weeks in . Hx of early miscarriage in 2025 and 2024. Wants to wait until after 10 weeks for pap smear.        History of Present Illness  Duran Baires is a 30 y.o. female  who presents for new OB. Patient's last menstrual period was 04/15/2025. Doing well so far. No cramping or bleeding. Fatigue is main symptom.      Review of Systems   Genitourinary:  Negative for decreased urine volume, difficulty urinating, dyspareunia, dysuria, enuresis, flank pain, frequency, genital sores, hematuria, menstrual problem, pelvic pain, urgency, vaginal bleeding, vaginal discharge and vaginal pain.   All other systems reviewed and are negative.       Personal History     OB History    Para Term  AB Living   5 2 2 0 2 1   SAB IAB Ectopic Molar Multiple Live Births   2 0 0 0 0 1      # Outcome Date GA Lbr Jeremiah/2nd Weight Sex Type Anes PTL Lv   5 Current            4 SAB 2025           3 Term 23 37w3d 10:00 / 00:01 2780 g (6 lb 2.1 oz) M Vag-Spont EPI N ADAN      Birth Comments: HC 34.5 cm AGA      Name: RODERICK BAIRES      Apgar1: 8  Apgar5: 9   2 Term 21 40w1d / 00:48 2948 g (6 lb 8 oz)  Vag-Spont EPI N FD      Birth Comments: HC 34cm       Name: DEACON,PENDING FD      Apgar1: 0  Apgar5: 0   1 SAB              Past Medical History:   Diagnosis Date    Depression     Female infertility     Polycystic ovary syndrome     dx'd by previous OBGYN     Urinary tract infection      Past Surgical History:   Procedure Laterality Date    COLONOSCOPY  2019    TONSILLECTOMY      WISDOM TOOTH EXTRACTION         Home Medications:  Probiotic Product and prenatal vitamin    Allergies:  She has no known allergies.    Objective    Objective     Vitals:   BP: (124)/(82) 124/82  /82    Wt 87.5 kg (193 lb)   BMI 32.12 kg/m²     Physical Exam  Vitals reviewed.   Constitutional:       General: She is not in acute distress.     Appearance: Normal appearance. She is not ill-appearing.   HENT:      Head: Normocephalic and atraumatic.      Nose: No congestion or rhinorrhea.   Eyes:      General: No scleral icterus.        Right eye: No discharge.         Left eye: No discharge.      Extraocular Movements: Extraocular movements intact.      Conjunctiva/sclera: Conjunctivae normal.   Pulmonary:      Effort: Pulmonary effort is normal. No accessory muscle usage or respiratory distress.   Musculoskeletal:      Right lower leg: No edema.      Left lower leg: No edema.   Skin:     General: Skin is warm and dry.      Coloration: Skin is not ashen, cyanotic or jaundiced.   Neurological:      General: No focal deficit present.      Mental Status: She is alert and oriented to person, place, and time.   Psychiatric:         Mood and Affect: Mood normal.         Behavior: Behavior is cooperative.         Result Review    US Ob < 14 Weeks Single or First Gestation (06/13/2025 10:31)   Intrauterine pregnancy at 7w3d by crown-rump length.  Uterus has a bicornate appearance.   Fetal heart rate: 149 bpm  Bilateral ovaries appear normal     Assessment & Plan   Assessment / Plan     Diagnoses and all orders for this visit:    1. 7 weeks gestation of pregnancy (Primary)  -     ABO / Rh  -     Ambulatory Referral to MFM/Perinatology  -     Antibody Screen  -     CBC & Differential  -     Hepatitis B Surface Antigen  -     Hepatitis C Antibody  -     HIV-1 / O / 2 Ag / Antibody  -     RPR Qualitative with Reflex to Quant  -     Rubella Antibody, IgG  -     ToxASSURE Select 13 (MW) - Urine, Clean Catch  -     Urine Culture - Urine, Urine, Clean Catch  -     Chlamydia trachomatis, Neisseria gonorrhoeae, PCR w/ confirmation - Urine, Urine, Clean Catch  -     Varicella Zoster Antibody, IgG    2. Multigravida in first  trimester  -     ABO / Rh  -     Ambulatory Referral to [facility]/Perinatology  -     Antibody Screen  -     CBC & Differential  -     Hepatitis B Surface Antigen  -     Hepatitis C Antibody  -     HIV-1 / O / 2 Ag / Antibody  -     RPR Qualitative with Reflex to Quant  -     Rubella Antibody, IgG  -     ToxASSURE Select 13 (MW) - Urine, Clean Catch  -     Urine Culture - Urine, Urine, Clean Catch  -     Chlamydia trachomatis, Neisseria gonorrhoeae, PCR w/ confirmation - Urine, Urine, Clean Catch  -     Varicella Zoster Antibody, IgG    3. Recurrent pregnancy loss in pregnant patient in first trimester, antepartum  -     Beta-2 Glycoprotein I Antibody,G / M  -     Lupus Anticoag / Cardiolipin Ab  -     Progesterone    4. History of IUFD    5. Bicornuate uterus affecting pregnancy in first trimester, antepartum        Discussion:   New OB Visit. US ordered today, reviewed and shows 7 weeks gestation, EDC 1/27/2026.  H/o IUFD in first, still born. Second uncomplicated. Two recent miscarriages.   Pregnancy appears to be in the right horn. Appears last three pregnancies in the right.   New OB labs ordered today. RPL labs as well.   Discussed normal prenatal routines  Questions answered  She wants to hold off on pap smear at this time.     Return in about 4 weeks (around 7/11/2025) for OB visit.    Michael Armendariz MD

## 2025-06-18 LAB
ABO GROUP BLD: NORMAL
BACTERIA UR CULT: NO GROWTH
BACTERIA UR CULT: NORMAL
BASOPHILS # BLD AUTO: 0.1 X10E3/UL (ref 0–0.2)
BASOPHILS NFR BLD AUTO: 1 %
BLD GP AB SCN SERPL QL: NEGATIVE
C TRACH RRNA SPEC QL NAA+PROBE: NEGATIVE
DRUGS UR: NORMAL
EOSINOPHIL # BLD AUTO: 0.2 X10E3/UL (ref 0–0.4)
EOSINOPHIL NFR BLD AUTO: 2 %
ERYTHROCYTE [DISTWIDTH] IN BLOOD BY AUTOMATED COUNT: 12.8 % (ref 11.7–15.4)
HBV SURFACE AG SERPL QL IA: NEGATIVE
HCT VFR BLD AUTO: 38.7 % (ref 34–46.6)
HCV IGG SERPL QL IA: NON REACTIVE
HGB BLD-MCNC: 12.5 G/DL (ref 11.1–15.9)
HIV 1+2 AB+HIV1 P24 AG SERPL QL IA: NON REACTIVE
IMM GRANULOCYTES # BLD AUTO: 0 X10E3/UL (ref 0–0.1)
IMM GRANULOCYTES NFR BLD AUTO: 0 %
LYMPHOCYTES # BLD AUTO: 2.7 X10E3/UL (ref 0.7–3.1)
LYMPHOCYTES NFR BLD AUTO: 36 %
MCH RBC QN AUTO: 30.1 PG (ref 26.6–33)
MCHC RBC AUTO-ENTMCNC: 32.3 G/DL (ref 31.5–35.7)
MCV RBC AUTO: 93 FL (ref 79–97)
MONOCYTES # BLD AUTO: 0.6 X10E3/UL (ref 0.1–0.9)
MONOCYTES NFR BLD AUTO: 8 %
N GONORRHOEA RRNA SPEC QL NAA+PROBE: NEGATIVE
NEUTROPHILS # BLD AUTO: 4.1 X10E3/UL (ref 1.4–7)
NEUTROPHILS NFR BLD AUTO: 53 %
PLATELET # BLD AUTO: 225 X10E3/UL (ref 150–450)
RBC # BLD AUTO: 4.15 X10E6/UL (ref 3.77–5.28)
RH BLD: POSITIVE
RPR SER QL: NON REACTIVE
RUBV IGG SERPL IA-ACNC: 18 INDEX
VZV IGG SER QL IA: REACTIVE
WBC # BLD AUTO: 7.6 X10E3/UL (ref 3.4–10.8)

## 2025-06-25 LAB
APTT HEX PL PPP: 4 SEC
APTT IMM NP PPP: NORMAL SEC
APTT PPP 1:1 SALINE: NORMAL SEC
APTT PPP: 27.6 SEC
B2 GLYCOPROT1 IGA SER-ACNC: <10 SAU
B2 GLYCOPROT1 IGG SER-ACNC: <10 SGU
B2 GLYCOPROT1 IGG SER-ACNC: <9 GPI IGG UNITS (ref 0–20)
B2 GLYCOPROT1 IGM SER-ACNC: <10 SMU
B2 GLYCOPROT1 IGM SER-ACNC: <9 GPI IGM UNITS (ref 0–32)
CARDIOLIPIN IGG SER IA-ACNC: <10 GPL
CARDIOLIPIN IGM SER IA-ACNC: <10 MPL
CONFIRM DRVVT: NORMAL SEC
DRVVT SCREEN TO CONFIRM RATIO: NORMAL RATIO
INR PPP: 1 RATIO
LABORATORY COMMENT REPORT: NORMAL
PROGEST SERPL-MCNC: 9.6 NG/ML
PROTHROMBIN TIME: 10.7 SEC
SCREEN DRVVT: 41.6 SEC
THROMBIN TIME: 18 SEC

## 2025-07-15 ENCOUNTER — ROUTINE PRENATAL (OUTPATIENT)
Age: 30
End: 2025-07-15
Payer: COMMERCIAL

## 2025-07-15 VITALS — WEIGHT: 193.8 LBS | BODY MASS INDEX: 32.25 KG/M2 | SYSTOLIC BLOOD PRESSURE: 112 MMHG | DIASTOLIC BLOOD PRESSURE: 76 MMHG

## 2025-07-15 DIAGNOSIS — Z34.82 MULTIGRAVIDA IN SECOND TRIMESTER: ICD-10-CM

## 2025-07-15 DIAGNOSIS — Z87.59 HISTORY OF IUFD: ICD-10-CM

## 2025-07-15 DIAGNOSIS — Z3A.12 12 WEEKS GESTATION OF PREGNANCY: Primary | ICD-10-CM

## 2025-07-15 NOTE — PROGRESS NOTES
Feeling well, no nausea  Reviewed dating ultrasound  Reviewed normal prenatal labs  Stop Prometrium  Discussed ffDNA screening, declines for now    Diagnoses and all orders for this visit:    1. 12 weeks gestation of pregnancy (Primary)    2. History of IUFD    3. Multigravida in second trimester

## 2025-08-15 ENCOUNTER — ROUTINE PRENATAL (OUTPATIENT)
Age: 30
End: 2025-08-15
Payer: COMMERCIAL

## 2025-08-15 VITALS — DIASTOLIC BLOOD PRESSURE: 47 MMHG | BODY MASS INDEX: 32.77 KG/M2 | WEIGHT: 196.9 LBS | SYSTOLIC BLOOD PRESSURE: 121 MMHG

## 2025-08-15 DIAGNOSIS — Z3A.16 16 WEEKS GESTATION OF PREGNANCY: Primary | ICD-10-CM
